# Patient Record
Sex: FEMALE | Race: OTHER | NOT HISPANIC OR LATINO
[De-identification: names, ages, dates, MRNs, and addresses within clinical notes are randomized per-mention and may not be internally consistent; named-entity substitution may affect disease eponyms.]

---

## 2017-01-18 ENCOUNTER — APPOINTMENT (OUTPATIENT)
Dept: DERMATOLOGY | Facility: CLINIC | Age: 67
End: 2017-01-18

## 2017-01-20 ENCOUNTER — APPOINTMENT (OUTPATIENT)
Dept: DERMATOLOGY | Facility: CLINIC | Age: 67
End: 2017-01-20

## 2017-01-23 ENCOUNTER — APPOINTMENT (OUTPATIENT)
Dept: DERMATOLOGY | Facility: CLINIC | Age: 67
End: 2017-01-23

## 2017-02-27 ENCOUNTER — APPOINTMENT (OUTPATIENT)
Dept: DERMATOLOGY | Facility: CLINIC | Age: 67
End: 2017-02-27

## 2017-02-27 VITALS — DIASTOLIC BLOOD PRESSURE: 60 MMHG | SYSTOLIC BLOOD PRESSURE: 92 MMHG

## 2017-02-27 DIAGNOSIS — L81.0 POSTINFLAMMATORY HYPERPIGMENTATION: ICD-10-CM

## 2017-03-01 ENCOUNTER — APPOINTMENT (OUTPATIENT)
Dept: DERMATOLOGY | Facility: CLINIC | Age: 67
End: 2017-03-01

## 2017-03-03 ENCOUNTER — APPOINTMENT (OUTPATIENT)
Dept: DERMATOLOGY | Facility: CLINIC | Age: 67
End: 2017-03-03

## 2017-03-03 VITALS — SYSTOLIC BLOOD PRESSURE: 90 MMHG | DIASTOLIC BLOOD PRESSURE: 60 MMHG

## 2017-03-06 ENCOUNTER — APPOINTMENT (OUTPATIENT)
Dept: DERMATOLOGY | Facility: CLINIC | Age: 67
End: 2017-03-06

## 2017-05-05 ENCOUNTER — APPOINTMENT (OUTPATIENT)
Dept: DERMATOLOGY | Facility: CLINIC | Age: 67
End: 2017-05-05

## 2017-05-05 VITALS — SYSTOLIC BLOOD PRESSURE: 120 MMHG | DIASTOLIC BLOOD PRESSURE: 82 MMHG

## 2017-05-05 RX ORDER — FLUTICASONE PROPIONATE AND SALMETEROL 50; 250 UG/1; UG/1
250-50 POWDER RESPIRATORY (INHALATION)
Qty: 60 | Refills: 0 | Status: ACTIVE | COMMUNITY
Start: 2017-02-07

## 2017-05-05 RX ORDER — RIVAROXABAN 20 MG/1
20 TABLET, FILM COATED ORAL
Qty: 90 | Refills: 0 | Status: ACTIVE | COMMUNITY
Start: 2016-02-29

## 2017-08-31 ENCOUNTER — APPOINTMENT (OUTPATIENT)
Dept: DERMATOLOGY | Facility: CLINIC | Age: 67
End: 2017-08-31
Payer: MEDICARE

## 2017-08-31 VITALS — DIASTOLIC BLOOD PRESSURE: 84 MMHG | SYSTOLIC BLOOD PRESSURE: 110 MMHG

## 2017-08-31 DIAGNOSIS — R23.8 OTHER SKIN CHANGES: ICD-10-CM

## 2017-08-31 PROCEDURE — 99213 OFFICE O/P EST LOW 20 MIN: CPT

## 2017-09-12 ENCOUNTER — APPOINTMENT (OUTPATIENT)
Dept: DERMATOLOGY | Facility: CLINIC | Age: 67
End: 2017-09-12

## 2017-09-14 ENCOUNTER — APPOINTMENT (OUTPATIENT)
Dept: DERMATOLOGY | Facility: CLINIC | Age: 67
End: 2017-09-14
Payer: MEDICARE

## 2017-09-14 VITALS — SYSTOLIC BLOOD PRESSURE: 100 MMHG | DIASTOLIC BLOOD PRESSURE: 65 MMHG

## 2017-09-14 PROCEDURE — 99213 OFFICE O/P EST LOW 20 MIN: CPT | Mod: 25

## 2017-09-14 PROCEDURE — 17110 DESTRUCTION B9 LES UP TO 14: CPT

## 2017-10-20 ENCOUNTER — APPOINTMENT (OUTPATIENT)
Dept: DERMATOLOGY | Facility: CLINIC | Age: 67
End: 2017-10-20
Payer: MEDICARE

## 2017-10-20 VITALS — SYSTOLIC BLOOD PRESSURE: 102 MMHG | DIASTOLIC BLOOD PRESSURE: 60 MMHG

## 2017-10-20 DIAGNOSIS — B07.0 PLANTAR WART: ICD-10-CM

## 2017-10-20 DIAGNOSIS — B07.9 VIRAL WART, UNSPECIFIED: ICD-10-CM

## 2017-10-20 PROCEDURE — 99213 OFFICE O/P EST LOW 20 MIN: CPT | Mod: 25

## 2017-10-20 PROCEDURE — 17110 DESTRUCTION B9 LES UP TO 14: CPT

## 2017-10-22 PROBLEM — B07.0 PLANTAR WART: Status: ACTIVE | Noted: 2017-10-20

## 2017-10-22 PROBLEM — B07.9 VERRUCA VULGARIS: Status: ACTIVE | Noted: 2017-09-14

## 2018-01-23 ENCOUNTER — APPOINTMENT (OUTPATIENT)
Dept: ORTHOPEDIC SURGERY | Facility: CLINIC | Age: 68
End: 2018-01-23
Payer: MEDICARE

## 2018-01-23 VITALS
SYSTOLIC BLOOD PRESSURE: 117 MMHG | WEIGHT: 118 LBS | BODY MASS INDEX: 20.91 KG/M2 | HEIGHT: 63 IN | DIASTOLIC BLOOD PRESSURE: 73 MMHG | HEART RATE: 60 BPM

## 2018-01-23 DIAGNOSIS — M77.11 LATERAL EPICONDYLITIS, RIGHT ELBOW: ICD-10-CM

## 2018-01-23 PROCEDURE — 99203 OFFICE O/P NEW LOW 30 MIN: CPT

## 2018-01-23 PROCEDURE — 73080 X-RAY EXAM OF ELBOW: CPT | Mod: RT

## 2018-07-06 ENCOUNTER — APPOINTMENT (OUTPATIENT)
Dept: OTOLARYNGOLOGY | Facility: CLINIC | Age: 68
End: 2018-07-06
Payer: MEDICARE

## 2018-07-06 VITALS
SYSTOLIC BLOOD PRESSURE: 130 MMHG | HEIGHT: 64 IN | WEIGHT: 119 LBS | BODY MASS INDEX: 20.32 KG/M2 | DIASTOLIC BLOOD PRESSURE: 78 MMHG | RESPIRATION RATE: 18 BRPM | HEART RATE: 60 BPM

## 2018-07-06 DIAGNOSIS — Z86.79 PERSONAL HISTORY OF OTHER DISEASES OF THE CIRCULATORY SYSTEM: ICD-10-CM

## 2018-07-06 DIAGNOSIS — L30.9 DERMATITIS, UNSPECIFIED: ICD-10-CM

## 2018-07-06 DIAGNOSIS — I10 ESSENTIAL (PRIMARY) HYPERTENSION: ICD-10-CM

## 2018-07-06 DIAGNOSIS — D18.00 HEMANGIOMA UNSPECIFIED SITE: ICD-10-CM

## 2018-07-06 DIAGNOSIS — M19.90 UNSPECIFIED OSTEOARTHRITIS, UNSPECIFIED SITE: ICD-10-CM

## 2018-07-06 DIAGNOSIS — D23.9 OTHER BENIGN NEOPLASM OF SKIN, UNSPECIFIED: ICD-10-CM

## 2018-07-06 DIAGNOSIS — H91.90 UNSPECIFIED HEARING LOSS, UNSPECIFIED EAR: ICD-10-CM

## 2018-07-06 PROCEDURE — 31231 NASAL ENDOSCOPY DX: CPT

## 2018-07-06 PROCEDURE — 99204 OFFICE O/P NEW MOD 45 MIN: CPT | Mod: 25

## 2018-07-15 PROBLEM — I10 HYPERTENSION: Status: ACTIVE | Noted: 2018-07-06

## 2018-10-09 ENCOUNTER — OUTPATIENT (OUTPATIENT)
Dept: OUTPATIENT SERVICES | Facility: HOSPITAL | Age: 68
LOS: 1 days | End: 2018-10-09

## 2018-10-09 VITALS
TEMPERATURE: 97 F | WEIGHT: 126.1 LBS | OXYGEN SATURATION: 98 % | HEART RATE: 61 BPM | SYSTOLIC BLOOD PRESSURE: 128 MMHG | HEIGHT: 63.75 IN | RESPIRATION RATE: 14 BRPM | DIASTOLIC BLOOD PRESSURE: 78 MMHG

## 2018-10-09 DIAGNOSIS — R04.0 EPISTAXIS: ICD-10-CM

## 2018-10-09 DIAGNOSIS — Z98.890 OTHER SPECIFIED POSTPROCEDURAL STATES: Chronic | ICD-10-CM

## 2018-10-09 DIAGNOSIS — I48.91 UNSPECIFIED ATRIAL FIBRILLATION: ICD-10-CM

## 2018-10-09 DIAGNOSIS — I10 ESSENTIAL (PRIMARY) HYPERTENSION: ICD-10-CM

## 2018-10-09 DIAGNOSIS — Z91.040 LATEX ALLERGY STATUS: ICD-10-CM

## 2018-10-09 DIAGNOSIS — Z98.891 HISTORY OF UTERINE SCAR FROM PREVIOUS SURGERY: Chronic | ICD-10-CM

## 2018-10-09 LAB
BUN SERPL-MCNC: 22 MG/DL — SIGNIFICANT CHANGE UP (ref 7–23)
CALCIUM SERPL-MCNC: 9.1 MG/DL — SIGNIFICANT CHANGE UP (ref 8.4–10.5)
CHLORIDE SERPL-SCNC: 99 MMOL/L — SIGNIFICANT CHANGE UP (ref 98–107)
CO2 SERPL-SCNC: 25 MMOL/L — SIGNIFICANT CHANGE UP (ref 22–31)
CREAT SERPL-MCNC: 0.9 MG/DL — SIGNIFICANT CHANGE UP (ref 0.5–1.3)
GLUCOSE SERPL-MCNC: 85 MG/DL — SIGNIFICANT CHANGE UP (ref 70–99)
HCT VFR BLD CALC: 42 % — SIGNIFICANT CHANGE UP (ref 34.5–45)
HGB BLD-MCNC: 13.9 G/DL — SIGNIFICANT CHANGE UP (ref 11.5–15.5)
MCHC RBC-ENTMCNC: 29 PG — SIGNIFICANT CHANGE UP (ref 27–34)
MCHC RBC-ENTMCNC: 33.1 % — SIGNIFICANT CHANGE UP (ref 32–36)
MCV RBC AUTO: 87.5 FL — SIGNIFICANT CHANGE UP (ref 80–100)
NRBC # FLD: 0 — SIGNIFICANT CHANGE UP
PLATELET # BLD AUTO: 253 K/UL — SIGNIFICANT CHANGE UP (ref 150–400)
PMV BLD: 12.8 FL — SIGNIFICANT CHANGE UP (ref 7–13)
POTASSIUM SERPL-MCNC: 3.4 MMOL/L — LOW (ref 3.5–5.3)
POTASSIUM SERPL-SCNC: 3.4 MMOL/L — LOW (ref 3.5–5.3)
RBC # BLD: 4.8 M/UL — SIGNIFICANT CHANGE UP (ref 3.8–5.2)
RBC # FLD: 13.1 % — SIGNIFICANT CHANGE UP (ref 10.3–14.5)
SODIUM SERPL-SCNC: 138 MMOL/L — SIGNIFICANT CHANGE UP (ref 135–145)
WBC # BLD: 7.12 K/UL — SIGNIFICANT CHANGE UP (ref 3.8–10.5)
WBC # FLD AUTO: 7.12 K/UL — SIGNIFICANT CHANGE UP (ref 3.8–10.5)

## 2018-10-09 RX ORDER — SODIUM CHLORIDE 9 MG/ML
3 INJECTION INTRAMUSCULAR; INTRAVENOUS; SUBCUTANEOUS EVERY 8 HOURS
Qty: 0 | Refills: 0 | Status: DISCONTINUED | OUTPATIENT
Start: 2018-10-23 | End: 2018-11-07

## 2018-10-09 RX ORDER — SODIUM CHLORIDE 9 MG/ML
1000 INJECTION, SOLUTION INTRAVENOUS
Qty: 0 | Refills: 0 | Status: DISCONTINUED | OUTPATIENT
Start: 2018-10-23 | End: 2018-11-07

## 2018-10-09 NOTE — H&P PST ADULT - PROBLEM SELECTOR PLAN 2
Pt on Xarelto. Was instructed by cardiologist to hold for 3 days before and day of surgery (see cardiac clearance in chart) however surgeon's office prefers pt to be off for one week preop. Called Dr. Fernandez's office and spoke with ALYSSA Martini who states surgeon will speak with cardiologist to determine plan. Will f/u.

## 2018-10-09 NOTE — H&P PST ADULT - MUSCULOSKELETAL
details… detailed exam no joint swelling/no calf tenderness/normal strength/no joint warmth/ROM intact/no joint erythema

## 2018-10-09 NOTE — H&P PST ADULT - FAMILY HISTORY
Father  Still living? Unknown  Family history of atrial fibrillation, Age at diagnosis: Age Unknown     Mother  Still living? Unknown  Family history of atrial fibrillation, Age at diagnosis: Age Unknown  Family history of stroke, Age at diagnosis: Age Unknown     Sibling  Still living? Unknown  Family history of breast cancer, Age at diagnosis: Age Unknown  Hypertension, Age at diagnosis: Age Unknown

## 2018-10-09 NOTE — H&P PST ADULT - PROBLEM SELECTOR PLAN 1
Endoscopic Resection of Anterior Right Inferior Turbinate Nasal Mass with the Guide CO2 Laser Possible Submucous Resection of Turbinates scheduled on 10/23/18.  Pre-op instructions provided. Pt verbalized understanding.   Pepcid provided for GI prophylaxis.

## 2018-10-09 NOTE — H&P PST ADULT - PMH
Atrial fibrillation  on xaleto  Eczema    Hypertension    Osteopenia Atrial fibrillation  on xarelto  Eczema    Hypertension    Osteopenia

## 2018-10-09 NOTE — H&P PST ADULT - HISTORY OF PRESENT ILLNESS
68 year old female with c/o frequent and prolonged nose bleeds worsening over the past few months. Pt has nasopharyngoscopy which found nasal mass. Pt presents today for presurgical evaluation for ... 68 year old female with c/o frequent and prolonged nose bleeds worsening over the past few months. Pt has nasopharyngoscopy which found nasal mass. Pt presents today for presurgical evaluation for Endoscopic Resection of Anterior Right Inferior Turbinate Nasal Mass with the Guide CO2 Laser Possible Submucous Resection of Turbinates scheduled on 10/23/18.

## 2018-10-09 NOTE — H&P PST ADULT - PSH
H/O umbilical hernia repair    History of  section  x2  S/P left knee arthroscopy    S/P right knee arthroscopy

## 2018-10-09 NOTE — H&P PST ADULT - NSANTHOSAYNRD_GEN_A_CORE
No. GERALD screening performed.  STOP BANG Legend: 0-2 = LOW Risk; 3-4 = INTERMEDIATE Risk; 5-8 = HIGH Risk

## 2018-10-10 PROBLEM — I48.91 UNSPECIFIED ATRIAL FIBRILLATION: Chronic | Status: ACTIVE | Noted: 2018-10-09

## 2018-10-22 ENCOUNTER — RX CHANGE (OUTPATIENT)
Age: 68
End: 2018-10-22

## 2018-10-22 ENCOUNTER — TRANSCRIPTION ENCOUNTER (OUTPATIENT)
Age: 68
End: 2018-10-22

## 2018-10-22 DIAGNOSIS — T78.40XA ALLERGY, UNSPECIFIED, INITIAL ENCOUNTER: ICD-10-CM

## 2018-10-23 ENCOUNTER — OUTPATIENT (OUTPATIENT)
Dept: OUTPATIENT SERVICES | Facility: HOSPITAL | Age: 68
LOS: 1 days | Discharge: HOME | End: 2018-10-23
Payer: COMMERCIAL

## 2018-10-23 ENCOUNTER — APPOINTMENT (OUTPATIENT)
Dept: OTOLARYNGOLOGY | Facility: HOSPITAL | Age: 68
End: 2018-10-23

## 2018-10-23 VITALS
HEIGHT: 63.75 IN | WEIGHT: 126.1 LBS | HEART RATE: 55 BPM | RESPIRATION RATE: 15 BRPM | OXYGEN SATURATION: 99 % | SYSTOLIC BLOOD PRESSURE: 127 MMHG | DIASTOLIC BLOOD PRESSURE: 58 MMHG | TEMPERATURE: 98 F

## 2018-10-23 DIAGNOSIS — Z98.890 OTHER SPECIFIED POSTPROCEDURAL STATES: Chronic | ICD-10-CM

## 2018-10-23 DIAGNOSIS — Z98.891 HISTORY OF UTERINE SCAR FROM PREVIOUS SURGERY: Chronic | ICD-10-CM

## 2018-10-23 DIAGNOSIS — R04.0 EPISTAXIS: ICD-10-CM

## 2018-10-23 LAB
BUN SERPL-MCNC: 23 MG/DL — SIGNIFICANT CHANGE UP (ref 7–23)
CALCIUM SERPL-MCNC: 8.8 MG/DL — SIGNIFICANT CHANGE UP (ref 8.4–10.5)
CHLORIDE SERPL-SCNC: 101 MMOL/L — SIGNIFICANT CHANGE UP (ref 98–107)
CO2 SERPL-SCNC: 23 MMOL/L — SIGNIFICANT CHANGE UP (ref 22–31)
CREAT SERPL-MCNC: 0.94 MG/DL — SIGNIFICANT CHANGE UP (ref 0.5–1.3)
GLUCOSE SERPL-MCNC: 126 MG/DL — HIGH (ref 70–99)
POTASSIUM SERPL-MCNC: 3.9 MMOL/L — SIGNIFICANT CHANGE UP (ref 3.5–5.3)
POTASSIUM SERPL-SCNC: 3.9 MMOL/L — SIGNIFICANT CHANGE UP (ref 3.5–5.3)
SODIUM SERPL-SCNC: 139 MMOL/L — SIGNIFICANT CHANGE UP (ref 135–145)

## 2018-10-23 PROCEDURE — 30118 REMOVAL OF INTRANASAL LESION: CPT | Mod: GC,RT

## 2018-10-23 PROCEDURE — 30130 EXCISE INFERIOR TURBINATE: CPT | Mod: GC,RT

## 2018-10-23 RX ORDER — FENTANYL CITRATE 50 UG/ML
25 INJECTION INTRAVENOUS
Qty: 0 | Refills: 0 | Status: DISCONTINUED | OUTPATIENT
Start: 2018-10-23 | End: 2018-10-24

## 2018-10-23 RX ORDER — METOCLOPRAMIDE HCL 10 MG
10 TABLET ORAL ONCE
Qty: 0 | Refills: 0 | Status: DISCONTINUED | OUTPATIENT
Start: 2018-10-23 | End: 2018-10-24

## 2018-10-23 RX ORDER — ACETAMINOPHEN 500 MG
1000 TABLET ORAL ONCE
Qty: 0 | Refills: 0 | Status: DISCONTINUED | OUTPATIENT
Start: 2018-10-23 | End: 2018-10-24

## 2018-10-23 RX ORDER — CEPHALEXIN 500 MG
1 CAPSULE ORAL
Qty: 20 | Refills: 0
Start: 2018-10-23 | End: 2018-11-01

## 2018-10-23 RX ORDER — ONDANSETRON 8 MG/1
4 TABLET, FILM COATED ORAL ONCE
Qty: 0 | Refills: 0 | Status: DISCONTINUED | OUTPATIENT
Start: 2018-10-23 | End: 2018-10-24

## 2018-10-23 RX ORDER — MAGNESIUM SULFATE 500 MG/ML
2 VIAL (ML) INJECTION ONCE
Qty: 0 | Refills: 0 | Status: COMPLETED | OUTPATIENT
Start: 2018-10-23 | End: 2018-10-23

## 2018-10-23 RX ORDER — OXYCODONE AND ACETAMINOPHEN 5; 325 MG/1; MG/1
1 TABLET ORAL
Qty: 10 | Refills: 0
Start: 2018-10-23

## 2018-10-23 RX ORDER — FENTANYL CITRATE 50 UG/ML
50 INJECTION INTRAVENOUS
Qty: 0 | Refills: 0 | Status: DISCONTINUED | OUTPATIENT
Start: 2018-10-23 | End: 2018-10-24

## 2018-10-23 RX ADMIN — Medication 50 GRAM(S): at 23:30

## 2018-10-23 RX ADMIN — SODIUM CHLORIDE 30 MILLILITER(S): 9 INJECTION, SOLUTION INTRAVENOUS at 18:49

## 2018-10-23 NOTE — ASU DISCHARGE PLAN (ADULT/PEDIATRIC). - MEDICATION SUMMARY - MEDICATIONS TO TAKE
I will START or STAY ON the medications listed below when I get home from the hospital:    AlgaeCal Plus  2  orally daily  -- Indication: For Epistaxis    Triple Action Joint Health 1 tab orally daily  -- last dose 10/15/18  -- Indication: For Epistaxis    Turmeric 1000 mg 2 caps orally daily  -- last dose 10/15/18  -- Indication: For Epistaxis    Omega Q Plus 100  2 caps orally daily last dose 10/15  -- Indication: For Epistaxis    Hyaluronic Acid 1 cap orally daily  -- last dose 10/15/18  -- Indication: For Epistaxis    Hydrolyzed Collagen 4 caplets orally daily  -- last dose 10/15/18  -- Indication: For Epistaxis    Percocet 5/325 oral tablet  -- 1 tab(s) by mouth every 4 hours MDD:6  -- Caution federal law prohibits the transfer of this drug to any person other  than the person for whom it was prescribed.  May cause drowsiness.  Alcohol may intensify this effect.  Use care when operating dangerous machinery.  This prescription cannot be refilled.  This product contains acetaminophen.  Do not use  with any other product containing acetaminophen to prevent possible liver damage.  Using more of this medication than prescribed may cause serious breathing problems.    -- Indication: For Epistaxis    rivaroxaban 20 mg oral tablet  -- 1 tab(s) by mouth once a day (in the evening)  -- Indication: For Epistaxis    amlodipine-olmesartan 10 mg-40 mg oral tablet  -- 1 tab(s) by mouth once a day  -- Indication: For Epistaxis    atenolol 25 mg oral tablet  -- 1 tab(s) by mouth once a day  -- Indication: For Epistaxis    Keflex 500 mg oral capsule  -- 1 cap(s) by mouth 2 times a day   -- Finish all this medication unless otherwise directed by prescriber.    -- Indication: For Epistaxis

## 2018-10-24 ENCOUNTER — RESULT REVIEW (OUTPATIENT)
Age: 68
End: 2018-10-24

## 2018-10-24 ENCOUNTER — APPOINTMENT (OUTPATIENT)
Dept: OTOLARYNGOLOGY | Facility: CLINIC | Age: 68
End: 2018-10-24
Payer: MEDICARE

## 2018-10-24 VITALS — HEART RATE: 72 BPM | OXYGEN SATURATION: 98 % | RESPIRATION RATE: 10 BRPM

## 2018-10-24 PROBLEM — L30.9 DERMATITIS, UNSPECIFIED: Chronic | Status: ACTIVE | Noted: 2018-10-09

## 2018-10-24 PROBLEM — M85.80 OTHER SPECIFIED DISORDERS OF BONE DENSITY AND STRUCTURE, UNSPECIFIED SITE: Chronic | Status: ACTIVE | Noted: 2018-10-09

## 2018-10-24 PROBLEM — I10 ESSENTIAL (PRIMARY) HYPERTENSION: Chronic | Status: ACTIVE | Noted: 2018-10-09

## 2018-10-24 PROCEDURE — 88311 DECALCIFY TISSUE: CPT | Mod: 26

## 2018-10-24 PROCEDURE — 88305 TISSUE EXAM BY PATHOLOGIST: CPT | Mod: 26

## 2018-10-24 PROCEDURE — 99024 POSTOP FOLLOW-UP VISIT: CPT

## 2018-10-26 LAB — SURGICAL PATHOLOGY STUDY: SIGNIFICANT CHANGE UP

## 2018-11-07 ENCOUNTER — APPOINTMENT (OUTPATIENT)
Dept: OTOLARYNGOLOGY | Facility: CLINIC | Age: 68
End: 2018-11-07
Payer: COMMERCIAL

## 2018-11-07 PROCEDURE — 99024 POSTOP FOLLOW-UP VISIT: CPT

## 2018-11-12 ENCOUNTER — APPOINTMENT (OUTPATIENT)
Dept: DERMATOLOGY | Facility: CLINIC | Age: 68
End: 2018-11-12
Payer: COMMERCIAL

## 2018-11-12 VITALS — DIASTOLIC BLOOD PRESSURE: 70 MMHG | SYSTOLIC BLOOD PRESSURE: 110 MMHG

## 2018-11-12 PROCEDURE — 99213 OFFICE O/P EST LOW 20 MIN: CPT

## 2018-11-14 ENCOUNTER — EMERGENCY (EMERGENCY)
Facility: HOSPITAL | Age: 68
LOS: 1 days | Discharge: HOME | End: 2018-11-14
Attending: EMERGENCY MEDICINE | Admitting: EMERGENCY MEDICINE
Payer: COMMERCIAL

## 2018-11-14 VITALS
HEART RATE: 64 BPM | RESPIRATION RATE: 15 BRPM | SYSTOLIC BLOOD PRESSURE: 125 MMHG | TEMPERATURE: 98 F | DIASTOLIC BLOOD PRESSURE: 81 MMHG | OXYGEN SATURATION: 97 %

## 2018-11-14 DIAGNOSIS — Z98.890 OTHER SPECIFIED POSTPROCEDURAL STATES: Chronic | ICD-10-CM

## 2018-11-14 DIAGNOSIS — Z98.891 HISTORY OF UTERINE SCAR FROM PREVIOUS SURGERY: Chronic | ICD-10-CM

## 2018-11-14 PROCEDURE — 99283 EMERGENCY DEPT VISIT LOW MDM: CPT | Mod: 25

## 2018-11-14 PROCEDURE — 30903 CONTROL OF NOSEBLEED: CPT | Mod: RT

## 2018-11-14 RX ADMIN — Medication 1 TABLET(S): at 22:30

## 2018-11-14 NOTE — ED ADULT TRIAGE NOTE - CHIEF COMPLAINT QUOTE
Pt c/o nosebleed on xarelto.  No active bleeding noted but states had polyp removal from nose approx 3 weeks ago, 10/23/18.  PMHx: VFIB, HTN

## 2018-11-14 NOTE — ED PROVIDER NOTE - MEDICAL DECISION MAKING DETAILS
EM PGY1 Candelaria Lynn MD: 67yo female with PMH of Atrial fibrillation on xarelto, Eczema, Hypertension, Osteopenia who presents with nose bleed at 4:30PM today. Pt hemodynamically stable, afebrile. No sign or symptoms of active bleeding or anemia. Pt is well appearing. Plan: d/c with f/u.

## 2018-11-14 NOTE — ED PROVIDER NOTE - OBJECTIVE STATEMENT
EM PGY1 Candelaria Lynn MD: 67yo female with PMH of Atrial fibrillation on xarelto, Eczema, Hypertension, Osteopenia who presents with nose bleed at 4:30PM today. Pt states that she was bleeding for approximately 1 hour before it stopped. No known recent trauma to the area. Pt had polyp removal from right nostril 3 weeks ago, with recent follow up 1 week ago with ENT. Congestion, dry cough since last night. No fever, chills, melena, hematochezia, lightheadedness, syncope.

## 2018-11-14 NOTE — ED PROVIDER NOTE - ATTENDING CONTRIBUTION TO CARE
69 yo M presents with resolved R nasal epistaxis. patient states that it started bleeding around 4:30 for about 1 hour. she intermittently applied pressure to nasal bridge and the bleeding resolved eventually. she has NO complaints at this time.   had a nasal polyp removed from R nare 3 weeks ago.   on xarelto.   no recent uri symptoms.   PE no facial TTP. no active epistaxis. lungs CTA. abd soft and NT 69 yo M presents with resolved R nasal epistaxis. patient states that it started bleeding around 4:30 for about 1 hour. she intermittently applied pressure to nasal bridge and the bleeding resolved eventually. she has NO complaints at this time.   had a nasal polyp removed from R nare 3 weeks ago.   on xarelto.   no recent uri symptoms.   PE no facial TTP. no active epistaxis. lungs CTA. abd soft and NT  Patient observed in the ER for a short while. epistaxis recurred. We applied  pressure to the nasal bridge for over 15 minutes. Epistaxis continued. No visualized source of bleeding for Cautery.   We packed the R nare with rhinorocket.  Successful [place] night of the bleeding was achieved. No recurrence of bleeding. Patient tolerated the procedure well. Patient was started on prophylactic antibiotics with Augmentin. Patient was discharged with instructions to  Keep nasal packing in place, antibiotics while packing in place , and follow up with ENT in 1-2 days for repeat evaluation and further management.    The patient was discharged from the ED in stable condition. All results of today's workup were discussed with the patient and all questions/concerns were addressed. All discharge instructions were thoroughly discussed with the patient, as well as important warning signs and new/ worsening symptoms which should necessitate patient's immediate return to the ED. The patient is agreeable with discharge and expresses full understanding of all instructions given. 69 yo M presents with resolved R nasal epistaxis. patient states that it started bleeding around 4:30 for about 1 hour. she intermittently applied pressure to nasal bridge and the bleeding resolved eventually. she has NO complaints at this time.   had a nasal polyp removed from R nare 3 weeks ago.   on xarelto.   no recent uri symptoms.   PE no facial TTP. no active epistaxis. lungs CTA. abd soft and NT  Patient observed in the ER for a short while. epistaxis recurred. We applied  pressure to the nasal bridge for over 15 minutes. Epistaxis continued. No visualized source of bleeding for Cautery.   We packed the R nare with rhinorocket.  Successful Tamponade of the bleeding was achieved. No recurrence of bleeding. Patient tolerated the procedure well. Vital signs stable, ambulate around the ER without complaints. Patient was started on prophylactic antibiotics with Augmentin. Patient was discharged with instructions to  Keep nasal packing in place, antibiotics while packing in place , and follow up with ENT in 1-2 days for repeat evaluation and further management.    The patient was discharged from the ED in stable condition. All results of today's workup were discussed with the patient and all questions/concerns were addressed. All discharge instructions were thoroughly discussed with the patient, as well as important warning signs and new/ worsening symptoms which should necessitate patient's immediate return to the ED. The patient is agreeable with discharge and expresses full understanding of all instructions given.

## 2018-11-14 NOTE — ED PROVIDER NOTE - NS ED ROS FT
EM PGY1 Candelaria Lynn MD:   General: denies fever, chills  HENT: +nasal congestion, denies sore throat, rhinorrhea  Eyes: denies vision changes  CV: denies chest pain  Resp: denies difficulty breathing, +cough  Abdominal: denies nausea, vomiting, diarrhea, abdominal pain, blood in stool, dark stool  : denies pain with urination  MSK: denies recent trauma  Neuro: denies headaches, numbness, tingling, dizziness, lightheadedness.  Skin: denies new rashes  Endocrine: denies recent weight loss

## 2018-11-14 NOTE — ED PROVIDER NOTE - PHYSICAL EXAMINATION
EM PGY1 Candelaria Lynn MD:   CONSTITUTIONAL: Nontoxic, well nourished, well developed, elderly female, resting comfortably in no acute distress  HEAD: Normocephalic; atraumatic  EYES: Normal inspection, EOMI  ENMT: External appears normal; normal oropharynx, no active bleeding from nostrils, dried blood in right nostril.   NECK: Supple; non-tender; no cervical lymphadenopathy  CARD: RRR; no audible murmurs, rubs, or gallops  RESP: No respiratory distress, lungs ctab/l  ABD: Soft, non-distended; non-tender; no rebound or guarding  EXT: No LE pitting edema or calf tenderness; distal pulses intact with good capillary refill  SKIN: Warm, dry, intact  NEURO: aaox3, moving all ext spontaneously.

## 2018-11-15 ENCOUNTER — APPOINTMENT (OUTPATIENT)
Dept: OTOLARYNGOLOGY | Facility: CLINIC | Age: 68
End: 2018-11-15
Payer: MEDICARE

## 2018-11-15 DIAGNOSIS — Z82.62 FAMILY HISTORY OF OSTEOPOROSIS: ICD-10-CM

## 2018-11-15 DIAGNOSIS — Z86.79 PERSONAL HISTORY OF OTHER DISEASES OF THE CIRCULATORY SYSTEM: ICD-10-CM

## 2018-11-15 DIAGNOSIS — Z87.39 PERSONAL HISTORY OF OTHER DISEASES OF THE MUSCULOSKELETAL SYSTEM AND CONNECTIVE TISSUE: ICD-10-CM

## 2018-11-15 DIAGNOSIS — Z78.9 OTHER SPECIFIED HEALTH STATUS: ICD-10-CM

## 2018-11-15 PROCEDURE — 99024 POSTOP FOLLOW-UP VISIT: CPT

## 2018-12-10 ENCOUNTER — APPOINTMENT (OUTPATIENT)
Dept: DERMATOLOGY | Facility: CLINIC | Age: 68
End: 2018-12-10
Payer: COMMERCIAL

## 2018-12-10 VITALS — DIASTOLIC BLOOD PRESSURE: 70 MMHG | SYSTOLIC BLOOD PRESSURE: 110 MMHG

## 2018-12-10 DIAGNOSIS — L25.9 UNSPECIFIED CONTACT DERMATITIS, UNSPECIFIED CAUSE: ICD-10-CM

## 2018-12-10 PROCEDURE — 99213 OFFICE O/P EST LOW 20 MIN: CPT

## 2018-12-10 RX ORDER — PREDNISONE 20 MG/1
20 TABLET ORAL TWICE DAILY
Qty: 28 | Refills: 2 | Status: COMPLETED | COMMUNITY
Start: 2018-10-22 | End: 2018-12-10

## 2018-12-10 RX ORDER — HYDROCORTISONE 25 MG/G
2.5 OINTMENT TOPICAL
Qty: 1 | Refills: 1 | Status: COMPLETED | COMMUNITY
Start: 2018-11-12 | End: 2018-12-10

## 2018-12-10 RX ORDER — TACROLIMUS 1 MG/G
0.1 OINTMENT TOPICAL
Qty: 1 | Refills: 1 | Status: COMPLETED | COMMUNITY
Start: 2017-09-14 | End: 2018-12-10

## 2018-12-10 RX ORDER — PERMETHRIN 50 MG/G
5 CREAM TOPICAL
Qty: 120 | Refills: 0 | Status: COMPLETED | COMMUNITY
Start: 2017-01-27 | End: 2018-12-10

## 2018-12-10 RX ORDER — HYDROCORTISONE 25 MG/G
2.5 CREAM TOPICAL TWICE DAILY
Qty: 1 | Refills: 2 | Status: COMPLETED | COMMUNITY
Start: 2017-08-31 | End: 2018-12-10

## 2018-12-11 PROBLEM — L25.9 CONTACT DERMATITIS: Status: ACTIVE | Noted: 2018-11-12

## 2019-01-08 ENCOUNTER — EMERGENCY (EMERGENCY)
Facility: HOSPITAL | Age: 69
LOS: 1 days | Discharge: HOME | End: 2019-01-08
Attending: EMERGENCY MEDICINE
Payer: COMMERCIAL

## 2019-01-08 VITALS
SYSTOLIC BLOOD PRESSURE: 108 MMHG | DIASTOLIC BLOOD PRESSURE: 61 MMHG | TEMPERATURE: 99 F | HEART RATE: 64 BPM | OXYGEN SATURATION: 97 % | RESPIRATION RATE: 16 BRPM

## 2019-01-08 VITALS
HEIGHT: 63 IN | HEART RATE: 97 BPM | OXYGEN SATURATION: 96 % | SYSTOLIC BLOOD PRESSURE: 132 MMHG | RESPIRATION RATE: 16 BRPM | TEMPERATURE: 98 F | DIASTOLIC BLOOD PRESSURE: 87 MMHG | WEIGHT: 123.9 LBS

## 2019-01-08 DIAGNOSIS — Z98.891 HISTORY OF UTERINE SCAR FROM PREVIOUS SURGERY: Chronic | ICD-10-CM

## 2019-01-08 DIAGNOSIS — Z98.890 OTHER SPECIFIED POSTPROCEDURAL STATES: Chronic | ICD-10-CM

## 2019-01-08 LAB
ALBUMIN SERPL ELPH-MCNC: 3.8 G/DL — SIGNIFICANT CHANGE UP (ref 3.3–5)
ALP SERPL-CCNC: 75 U/L — SIGNIFICANT CHANGE UP (ref 40–120)
ALT FLD-CCNC: 119 U/L — HIGH (ref 10–45)
ANION GAP SERPL CALC-SCNC: 13 MMOL/L — SIGNIFICANT CHANGE UP (ref 5–17)
AST SERPL-CCNC: 95 U/L — HIGH (ref 10–40)
BASOPHILS # BLD AUTO: 0.1 K/UL — SIGNIFICANT CHANGE UP (ref 0–0.2)
BASOPHILS NFR BLD AUTO: 0.3 % — SIGNIFICANT CHANGE UP (ref 0–2)
BILIRUB SERPL-MCNC: 0.8 MG/DL — SIGNIFICANT CHANGE UP (ref 0.2–1.2)
BUN SERPL-MCNC: 18 MG/DL — SIGNIFICANT CHANGE UP (ref 7–23)
CALCIUM SERPL-MCNC: 9.3 MG/DL — SIGNIFICANT CHANGE UP (ref 8.4–10.5)
CHLORIDE SERPL-SCNC: 100 MMOL/L — SIGNIFICANT CHANGE UP (ref 96–108)
CO2 SERPL-SCNC: 25 MMOL/L — SIGNIFICANT CHANGE UP (ref 22–31)
CREAT SERPL-MCNC: 0.82 MG/DL — SIGNIFICANT CHANGE UP (ref 0.5–1.3)
EOSINOPHIL # BLD AUTO: 0.1 K/UL — SIGNIFICANT CHANGE UP (ref 0–0.5)
EOSINOPHIL NFR BLD AUTO: 0.3 % — SIGNIFICANT CHANGE UP (ref 0–6)
GLUCOSE SERPL-MCNC: 128 MG/DL — HIGH (ref 70–99)
HCT VFR BLD CALC: 37.7 % — SIGNIFICANT CHANGE UP (ref 34.5–45)
HGB BLD-MCNC: 13.1 G/DL — SIGNIFICANT CHANGE UP (ref 11.5–15.5)
LYMPHOCYTES # BLD AUTO: 1.1 K/UL — SIGNIFICANT CHANGE UP (ref 1–3.3)
LYMPHOCYTES # BLD AUTO: 7.6 % — LOW (ref 13–44)
MCHC RBC-ENTMCNC: 29.9 PG — SIGNIFICANT CHANGE UP (ref 27–34)
MCHC RBC-ENTMCNC: 34.7 GM/DL — SIGNIFICANT CHANGE UP (ref 32–36)
MCV RBC AUTO: 86.1 FL — SIGNIFICANT CHANGE UP (ref 80–100)
MONOCYTES # BLD AUTO: 1.4 K/UL — HIGH (ref 0–0.9)
MONOCYTES NFR BLD AUTO: 9.4 % — SIGNIFICANT CHANGE UP (ref 2–14)
NEUTROPHILS # BLD AUTO: 12.3 K/UL — HIGH (ref 1.8–7.4)
NEUTROPHILS NFR BLD AUTO: 82.3 % — HIGH (ref 43–77)
PLATELET # BLD AUTO: 222 K/UL — SIGNIFICANT CHANGE UP (ref 150–400)
POTASSIUM SERPL-MCNC: 3.6 MMOL/L — SIGNIFICANT CHANGE UP (ref 3.5–5.3)
POTASSIUM SERPL-SCNC: 3.6 MMOL/L — SIGNIFICANT CHANGE UP (ref 3.5–5.3)
PROT SERPL-MCNC: 7.2 G/DL — SIGNIFICANT CHANGE UP (ref 6–8.3)
RBC # BLD: 4.38 M/UL — SIGNIFICANT CHANGE UP (ref 3.8–5.2)
RBC # FLD: 12.4 % — SIGNIFICANT CHANGE UP (ref 10.3–14.5)
SODIUM SERPL-SCNC: 138 MMOL/L — SIGNIFICANT CHANGE UP (ref 135–145)
TROPONIN T, HIGH SENSITIVITY RESULT: 8 NG/L — SIGNIFICANT CHANGE UP (ref 0–51)
WBC # BLD: 14.9 K/UL — HIGH (ref 3.8–10.5)
WBC # FLD AUTO: 14.9 K/UL — HIGH (ref 3.8–10.5)

## 2019-01-08 PROCEDURE — 93005 ELECTROCARDIOGRAM TRACING: CPT

## 2019-01-08 PROCEDURE — 93010 ELECTROCARDIOGRAM REPORT: CPT

## 2019-01-08 PROCEDURE — 84484 ASSAY OF TROPONIN QUANT: CPT

## 2019-01-08 PROCEDURE — 99285 EMERGENCY DEPT VISIT HI MDM: CPT | Mod: 25

## 2019-01-08 PROCEDURE — 71046 X-RAY EXAM CHEST 2 VIEWS: CPT | Mod: 26

## 2019-01-08 PROCEDURE — 99284 EMERGENCY DEPT VISIT MOD MDM: CPT | Mod: 25

## 2019-01-08 PROCEDURE — 85027 COMPLETE CBC AUTOMATED: CPT

## 2019-01-08 PROCEDURE — 71046 X-RAY EXAM CHEST 2 VIEWS: CPT

## 2019-01-08 PROCEDURE — 96374 THER/PROPH/DIAG INJ IV PUSH: CPT

## 2019-01-08 PROCEDURE — 80053 COMPREHEN METABOLIC PANEL: CPT

## 2019-01-08 RX ORDER — KETOROLAC TROMETHAMINE 30 MG/ML
15 SYRINGE (ML) INJECTION ONCE
Qty: 0 | Refills: 0 | Status: DISCONTINUED | OUTPATIENT
Start: 2019-01-08 | End: 2019-01-08

## 2019-01-08 RX ADMIN — Medication 15 MILLIGRAM(S): at 08:42

## 2019-01-08 RX ADMIN — Medication 15 MILLIGRAM(S): at 07:38

## 2019-01-08 NOTE — ED PROVIDER NOTE - MEDICAL DECISION MAKING DETAILS
69yo F with PMH afib on Xarelto, eczema, HTN, osteopenia presenting with chest pain starting at 2200 earlier tonight in the setting of 7-8 days of intermittent non-productive cough. 69yo F with PMH afib on Xarelto, eczema, HTN, osteopenia presenting with chest pain starting at 2200 earlier tonight in the setting of 7-8 days of intermittent non-productive cough. CXR clear lungs. Will recommend continuing outpatient abx as ordered and f/u with PMD. Troponin 12 initially, repeat pending. Chest pain likely costochondritis 2/2 frequent cough.

## 2019-01-08 NOTE — ED ADULT NURSE NOTE - NSIMPLEMENTINTERV_GEN_ALL_ED
Implemented All Universal Safety Interventions:  [place] to call system. Call bell, personal items and telephone within reach. Instruct patient to call for assistance. Room bathroom lighting operational. Non-slip footwear when patient is off stretcher. Physically safe environment: no spills, clutter or unnecessary equipment. Stretcher in lowest position, wheels locked, appropriate side rails in place.

## 2019-01-08 NOTE — ED PROVIDER NOTE - ATTENDING CONTRIBUTION TO CARE
68F, pmh afib on xarelto, htn, presents with cough x 1 week, chest pain beginning ~10 pm yesterday. pt with ~1 week non-productive cough, seen by pmd yesterday and rx'd an antibiotic (but not sure which). also with subjective fever at home x 2-3 days. yesterday evening, developed waxing and waning chest pain, across front of chest, worse with cough or taking a deep breath, soreness, moderate, primary located in L pectoral region. non-exertional. no associated sob, diaphoresis, extremity pain/numbness. pain most sig when coughing. does not radiate to back. denies calf pain/swelling, hx dvt or pe. denies headache dizziness, abd pain, n/v/d, changes in urination or bm's, or any other complaints.    PE: Well appearing female in NAD, NCAT, MMM, Trachea midline, Normal conjunctiva, lungs CTAB, S1/S2 RRR, Normal perfusion, 2+ radial pulses bilat, Abdomen Soft, NTND, No rebound/guarding, No LE edema, No deformity of extremities, No rashes,  No focal motor or sensory deficits.     Pt well appearing. VS without sig abnormality. Chest pain is in setting of cough x ~1 week, worse with cough, most likely 2/2 muscle strain or costochondritis. consider acs, although much lower suspicion, ekg without acute ischemic changes, non-exertional, no associated cardiac sx, obtain troponin. very low suspicion pe, no tachycardia or hypoxia, is compliant with her xarelto, no calf pain/swelling. very low suspicion of aortic pathology, equal pulses, does not radiate to back, without sig hypertension. Check CBC eval for anemia, leukocytosis, cmp eval for metabolic derangement. CXR eval for consolidation. Re-eval. - Javier Kenny MD

## 2019-01-08 NOTE — ED PROVIDER NOTE - OBJECTIVE STATEMENT
Pt is a 67yo F with PMH afib on Xarelto, eczema, HTN, osteopenia presenting with chest pain starting at 2200 earlier tonight in the setting of 7-8 days of intermittent non-productive cough. She reports having had low grade subjective fevers at home for 2-3 days. Earlier today she went to see her PMD Dr. Morse and was ordered for an antibiotic. She took the first dose this evening at 8pm and cannot recall the antibiotic. She also had an outpatient XR, read pending. Regarding her chest pain, she reports that it moves from the left to right side of her chest and is exacerbated by deep breaths and cough, currently located primarily on left side of chest. It does not radiate to back or shoulder and is not associated with shortness of breath. Exertion does not exacerbate her chest pain. ROS otherwise positive for post-nasal drip.

## 2019-01-08 NOTE — ED PROVIDER NOTE - PROGRESS NOTE DETAILS
pt with continued L-sided chest soreness, worse with cough. delta trop 8 from 12. remains very low suspicion of cardiac etiology. pt to be given toradol and re-assessed. will touch base with pt's cardiologist bianka. if sx improve plan for d/c with close cards f/u. pt signed out to dr macias. - Javier Kenny MD [place]: Attempted to call Jono x 3 with no response. Spoke with patient, patient understands to f/u outpatient with cardiologist. Agrees with plan and would like to go home. Patient not having any chest pain at this time and improved with toradol.  will d/c home.

## 2019-01-08 NOTE — ED ADULT NURSE REASSESSMENT NOTE - NS ED NURSE REASSESS COMMENT FT1
Pt is resting comfortably, states pain at rest is a 3/10, states pain is on the left chest. Administered ketoralac,  is at bedside.
Pt is currently resting comfortably, pending pt's cardiologist call back. Will continue to monitor.

## 2019-01-08 NOTE — ED PROVIDER NOTE - NSFOLLOWUPINSTRUCTIONS_ED_ALL_ED_FT
You were seen in the Emergency Department (ED) for chest pain. Your workup in the ED was negative for life-threatening causes of chest pain. Please take over the counter Tylenol or Ibuprofen as directed by the packaging insert for your pain.   Please follow up with your Cardiologist in the next 2 days.   Please immediately return to the ED if you experience any new, concerning, or worsening symptoms, including, but not limited to the following: persistent, or worsening pain, difficulty breathing, fever, chills. Thank you for choosing a [facility] ED.

## 2019-01-08 NOTE — ED PROVIDER NOTE - CONSTITUTIONAL, MLM
normal... Well appearing, well nourished, awake, alert, oriented to person, place, time/situation and in no apparent distress. Hoarse voice

## 2019-01-08 NOTE — ED ADULT NURSE NOTE - OBJECTIVE STATEMENT
Pt presents to the ED with complaint of chest pain, AXOX3, with sig other at the bedside, reports pain beginning at 9pm, to the left side of chest under the breast, radiating "back and forth" to the right side, pt states pain is sharp in quality, no diaphoresis, no nausea vomiting or diarrhea, reports low grade fever in the evening, with cough, breathing unlabored and symmetrical, reports pain with inspiration, no shortness of breath, no dysuria or hematuria. Pt took one baby ASA at home prior to coming to ED.

## 2019-01-23 ENCOUNTER — APPOINTMENT (OUTPATIENT)
Dept: OTOLARYNGOLOGY | Facility: CLINIC | Age: 69
End: 2019-01-23
Payer: MEDICARE

## 2019-01-23 VITALS
HEART RATE: 70 BPM | WEIGHT: 122 LBS | BODY MASS INDEX: 20.83 KG/M2 | HEIGHT: 64 IN | DIASTOLIC BLOOD PRESSURE: 73 MMHG | SYSTOLIC BLOOD PRESSURE: 111 MMHG

## 2019-01-23 DIAGNOSIS — Z78.9 OTHER SPECIFIED HEALTH STATUS: ICD-10-CM

## 2019-01-23 DIAGNOSIS — J34.3 HYPERTROPHY OF NASAL TURBINATES: ICD-10-CM

## 2019-01-23 DIAGNOSIS — R04.0 EPISTAXIS: ICD-10-CM

## 2019-01-23 DIAGNOSIS — I99.9 UNSPECIFIED DISORDER OF CIRCULATORY SYSTEM: ICD-10-CM

## 2019-01-23 DIAGNOSIS — I48.91 UNSPECIFIED ATRIAL FIBRILLATION: ICD-10-CM

## 2019-01-23 DIAGNOSIS — Z80.8 FAMILY HISTORY OF MALIGNANT NEOPLASM OF OTHER ORGANS OR SYSTEMS: ICD-10-CM

## 2019-01-23 PROCEDURE — 31231 NASAL ENDOSCOPY DX: CPT

## 2019-01-23 PROCEDURE — 99214 OFFICE O/P EST MOD 30 MIN: CPT | Mod: 25

## 2019-01-23 RX ORDER — HYDROXYZINE HYDROCHLORIDE 10 MG/1
10 TABLET ORAL
Qty: 30 | Refills: 0 | Status: DISCONTINUED | COMMUNITY
Start: 2017-01-27 | End: 2019-01-23

## 2019-01-23 RX ORDER — AMOXICILLIN AND CLAVULANATE POTASSIUM 875; 125 MG/1; MG/1
875-125 TABLET, COATED ORAL
Qty: 14 | Refills: 0 | Status: DISCONTINUED | COMMUNITY
Start: 2017-02-02 | End: 2019-01-23

## 2019-01-23 RX ORDER — HALOBETASOL PROPIONATE 0.5 MG/G
0.05 OINTMENT TOPICAL
Qty: 50 | Refills: 0 | Status: DISCONTINUED | COMMUNITY
Start: 2017-01-27 | End: 2019-01-23

## 2019-01-23 RX ORDER — MUPIROCIN 20 MG/G
2 OINTMENT TOPICAL
Qty: 22 | Refills: 0 | Status: DISCONTINUED | COMMUNITY
Start: 2017-01-27 | End: 2019-01-23

## 2019-01-23 RX ORDER — DOXYCYCLINE HYCLATE 100 MG/1
100 CAPSULE ORAL
Qty: 14 | Refills: 0 | Status: DISCONTINUED | COMMUNITY
Start: 2017-03-04 | End: 2019-01-23

## 2019-01-23 RX ORDER — PROMETHAZINE HYDROCHLORIDE AND DEXTROMETHORPHAN HYDROBROMIDE ORAL SOLUTION 15; 6.25 MG/5ML; MG/5ML
6.25-15 SOLUTION ORAL
Qty: 118 | Refills: 0 | Status: DISCONTINUED | COMMUNITY
Start: 2017-03-04 | End: 2019-01-23

## 2019-01-23 RX ORDER — CLOBETASOL PROPIONATE 0.5 MG/G
0.05 OINTMENT TOPICAL
Qty: 1 | Refills: 1 | Status: DISCONTINUED | COMMUNITY
Start: 2018-11-12 | End: 2019-01-23

## 2019-01-23 RX ORDER — EUCALYPTUS/PEPPERMINT OIL
SOLUTION, NON-ORAL NASAL
Qty: 1 | Refills: 5 | Status: ACTIVE | COMMUNITY
Start: 2019-01-23 | End: 1900-01-01

## 2019-01-23 RX ORDER — TACROLIMUS 1 MG/G
0.1 OINTMENT TOPICAL
Qty: 1 | Refills: 3 | Status: DISCONTINUED | COMMUNITY
Start: 2018-12-10 | End: 2019-01-23

## 2019-01-23 NOTE — PROCEDURE
[Image(s) Captured] : image(s) captured and filed [Video Captured] : video captured and filed [Topical Lidocaine] : topical lidocaine [Oxymetazoline HCl] : oxymetazoline HCl [Rigid Endoscope] : examined with a rigid endoscope [Serial Number: ___] : Serial Number: [unfilled] [Recalcitrant Symptoms] : recalcitrant symptoms  [Anatomical Abnormality] : anatomical abnormality [Epistaxis] : evaluation of epistaxis [Anterior rhinoscopy insufficient to account for symptoms] : anterior rhinoscopy insufficient to account for symptoms [FreeTextEntry6] : Pre-op indication(s): right vascular lesion\par Post-op indication(s): turbinate healing\par Verbal consent obtained from patient.\par “Anterior rhinoscopy insufficient to account for symptoms” \par Details for procedure: \par Scope #: [id number]\par Type of scope:    flexible fiber optic telescope  X   Rigid glass telescope \par Anesthesia and/or vasoconstriction was achieved topically by using: \par 4% Lidocaine spray   0.05% Oxymetazoline     Other ______ \par The following anatomic sites were directly examined in a sequential fashion: \par The scope was introduced in the nasal passage between the middle and inferior turbinates to exam the inferior portion of the middle meatus and the fontanelle, as well as the maxillary ostia. Next, the scope was passed medially and posteriorly to the middle turbinates to examine the sphenoethmoid recess and the superior turbinate region. \par Upon visualization the finders are as follows: \par Nasal Septum:   Normal  \par Bleeding site cauterized:    Anterior   left   right   Posterior   left   right \par Method:   Silver Nitrate   YAG Laser    Electrocautery ______ \par Right Side: \par * Mucosa: Normal\par * Mucous: Normal\par * Polyp: Normal\par * Inferior Turbinate: Crusted region the tumor was removed\par * Middle Turbinate: Normal\par * Superior Turbinate: Normal\par * Inferior Meatus: Normal\par * Middle Meatus: Normal\par * Super Meatus: Normal\par * Sphenoethmoidal Recess: Normal\par Left Side: \par * Mucosa: Normal\par * Mucous: Normal\par * Polyp: Normal\par * Inferior Turbinate: Normal\par * Middle Turbinate: Normal\par * Superior Turbinate: Normal\par * Inferior Meatus: Normal\par * Middle Meatus: Normal\par * Super Meatus: Normal\par * Sphenoethmoidal Recess: Normal\par The patient tolerated the procedure well without any complications.\par \par \par

## 2019-01-23 NOTE — PHYSICAL EXAM
[Nasal Endoscopy Performed] : nasal endoscopy was performed, see procedure section for findings [Midline] : trachea located in midline position [Normal] : no rashes [de-identified] : anterior right tip healing

## 2019-01-23 NOTE — REASON FOR VISIT
[Subsequent Evaluation] : a subsequent evaluation for [Other: _____] : [unfilled] [Nasal Obstruction] : nasal obstruction [Epistaxis] : epistaxis [FreeTextEntry2] : follow up s/p Endoscopic CO2 laser resection of right nasal mass 10/23/18

## 2019-01-23 NOTE — HISTORY OF PRESENT ILLNESS
[de-identified] : 68 year old female follow up s/p Endoscopic CO2 laser resection of right nasal mass 10/23/18.  States no further bleeding from right nostril since November 2018.  States breathing well, denies pain.

## 2019-12-29 ENCOUNTER — TRANSCRIPTION ENCOUNTER (OUTPATIENT)
Age: 69
End: 2019-12-29

## 2020-01-12 ENCOUNTER — TRANSCRIPTION ENCOUNTER (OUTPATIENT)
Age: 70
End: 2020-01-12

## 2020-09-30 NOTE — ED PROVIDER NOTE - NS_HEARTSCORE_ED_A_ED_CALC
Otezla Counseling: The side effects of Otezla were discussed with the patient, including but not limited to worsening or new depression, weight loss, diarrhea, nausea, upper respiratory tract infection, and headache. Patient instructed to call the office should any adverse effect occur.  The patient verbalized understanding of the proper use and possible adverse effects of Otezla.  All the patient's questions and concerns were addressed. Methotrexate Counseling:  Patient counseled regarding adverse effects of methotrexate including but not limited to nausea, vomiting, abnormalities in liver function tests. Patients may develop mouth sores, rash, diarrhea, and abnormalities in blood counts. The patient understands that monitoring is required including LFT's and blood counts.  There is a rare possibility of scarring of the liver and lung problems that can occur when taking methotrexate. Persistent nausea, loss of appetite, pale stools, dark urine, cough, and shortness of breath should be reported immediately. Patient advised to discontinue methotrexate treatment at least three months before attempting to become pregnant.  I discussed the need for folate supplements while taking methotrexate.  These supplements can decrease side effects during methotrexate treatment. The patient verbalized understanding of the proper use and possible adverse effects of methotrexate.  All of the patient's questions and concerns were addressed. Enbrel Pregnancy And Lactation Text: This medication is Pregnancy Category B and is considered safe during pregnancy. It is unknown if this medication is excreted in breast milk. Bactrim Counseling:  I discussed with the patient the risks of sulfa antibiotics including but not limited to GI upset, allergic reaction, drug rash, diarrhea, dizziness, photosensitivity, and yeast infections.  Rarely, more serious reactions can occur including but not limited to aplastic anemia, agranulocytosis, methemoglobinemia, blood dyscrasias, liver or kidney failure, lung infiltrates or desquamative/blistering drug rashes. Colchicine Counseling:  Patient counseled regarding adverse effects including but not limited to stomach upset (nausea, vomiting, stomach pain, or diarrhea).  Patient instructed to limit alcohol consumption while taking this medication.  Colchicine may reduce blood counts especially with prolonged use.  The patient understands that monitoring of kidney function and blood counts may be required, especially at baseline. The patient verbalized understanding of the proper use and possible adverse effects of colchicine.  All of the patient's questions and concerns were addressed. Taltz Counseling: I discussed with the patient the risks of ixekizumab including but not limited to immunosuppression, serious infections, worsening of inflammatory bowel disease and drug reactions.  The patient understands that monitoring is required including a PPD at baseline and must alert us or the primary physician if symptoms of infection or other concerning signs are noted. Benzoyl Peroxide Counseling: Patient counseled that medicine may cause skin irritation and bleach clothing.  In the event of skin irritation, the patient was advised to reduce the amount of the drug applied or use it less frequently.   The patient verbalized understanding of the proper use and possible adverse effects of benzoyl peroxide.  All of the patient's questions and concerns were addressed. Imiquimod Counseling:  I discussed with the patient the risks of imiquimod including but not limited to erythema, scaling, itching, weeping, crusting, and pain.  Patient understands that the inflammatory response to imiquimod is variable from person to person and was educated regarded proper titration schedule.  If flu-like symptoms develop, patient knows to discontinue the medication and contact us. Tetracycline Pregnancy And Lactation Text: This medication is Pregnancy Category D and not consider safe during pregnancy. It is also excreted in breast milk. Topical Clindamycin Pregnancy And Lactation Text: This medication is Pregnancy Category B and is considered safe during pregnancy. It is unknown if it is excreted in breast milk. Otezla Pregnancy And Lactation Text: This medication is Pregnancy Category C and it isn't known if it is safe during pregnancy. It is unknown if it is excreted in breast milk. Methotrexate Pregnancy And Lactation Text: This medication is Pregnancy Category X and is known to cause fetal harm. This medication is excreted in breast milk. Hydroxyzine Counseling: Patient advised that the medication is sedating and not to drive a car after taking this medication.  Patient informed of potential adverse effects including but not limited to dry mouth, urinary retention, and blurry vision.  The patient verbalized understanding of the proper use and possible adverse effects of hydroxyzine.  All of the patient's questions and concerns were addressed. Benzoyl Peroxide Pregnancy And Lactation Text: This medication is Pregnancy Category C. It is unknown if benzoyl peroxide is excreted in breast milk. Humira Counseling:  I discussed with the patient the risks of adalimumab including but not limited to myelosuppression, immunosuppression, autoimmune hepatitis, demyelinating diseases, lymphoma, and serious infections.  The patient understands that monitoring is required including a PPD at baseline and must alert us or the primary physician if symptoms of infection or other concerning signs are noted. Topical Sulfur Applications Counseling: Topical Sulfur Counseling: Patient counseled that this medication may cause skin irritation or allergic reactions.  In the event of skin irritation, the patient was advised to reduce the amount of the drug applied or use it less frequently.   The patient verbalized understanding of the proper use and possible adverse effects of topical sulfur application.  All of the patient's questions and concerns were addressed. Taltz Pregnancy And Lactation Text: The risk during pregnancy and breastfeeding is uncertain with this medication. Prednisone Counseling:  I discussed with the patient the risks of prolonged use of prednisone including but not limited to weight gain, insomnia, osteoporosis, mood changes, diabetes, susceptibility to infection, glaucoma and high blood pressure.  In cases where prednisone use is prolonged, patients should be monitored with blood pressure checks, serum glucose levels and an eye exam.  Additionally, the patient may need to be placed on GI prophylaxis, PCP prophylaxis, and calcium and vitamin D supplementation and/or a bisphosphonate.  The patient verbalized understanding of the proper use and the possible adverse effects of prednisone.  All of the patient's questions and concerns were addressed. Bactrim Pregnancy And Lactation Text: This medication is Pregnancy Category D and is known to cause fetal risk.  It is also excreted in breast milk. Hydroxyzine Pregnancy And Lactation Text: This medication is not safe during pregnancy and should not be taken. It is also excreted in breast milk and breast feeding isn't recommended. Imiquimod Pregnancy And Lactation Text: This medication is Pregnancy Category C. It is unknown if this medication is excreted in breast milk. Colchicine Pregnancy And Lactation Text: This medication is Pregnancy Category C and isn't considered safe during pregnancy. It is excreted in breast milk. Oxybutynin Counseling:  I discussed with the patient the risks of oxybutynin including but not limited to skin rash, drowsiness, dry mouth, difficulty urinating, and blurred vision. Tremfya Counseling: I discussed with the patient the risks of guselkumab including but not limited to immunosuppression, serious infections, worsening of inflammatory bowel disease and drug reactions.  The patient understands that monitoring is required including a PPD at baseline and must alert us or the primary physician if symptoms of infection or other concerning signs are noted. Carac Counseling:  I discussed with the patient the risks of Carac including but not limited to erythema, scaling, itching, weeping, crusting, and pain. Fluconazole Counseling:  Patient counseled regarding adverse effects of fluconazole including but not limited to headache, diarrhea, nausea, upset stomach, liver function test abnormalities, taste disturbance, and stomach pain.  There is a rare possibility of liver failure that can occur when taking fluconazole.  The patient understands that monitoring of LFTs and kidney function test may be required, especially at baseline. The patient verbalized understanding of the proper use and possible adverse effects of fluconazole.  All of the patient's questions and concerns were addressed. Dapsone Counseling: I discussed with the patient the risks of dapsone including but not limited to hemolytic anemia, agranulocytosis, rashes, methemoglobinemia, kidney failure, peripheral neuropathy, headaches, GI upset, and liver toxicity.  Patients who start dapsone require monitoring including baseline LFTs and weekly CBCs for the first month, then every month thereafter.  The patient verbalized understanding of the proper use and possible adverse effects of dapsone.  All of the patient's questions and concerns were addressed. Minoxidil Counseling: Minoxidil is a topical medication which can increase blood flow where it is applied. It is uncertain how this medication increases hair growth. Side effects are uncommon and include stinging and allergic reactions. Cephalexin Counseling: I counseled the patient regarding use of cephalexin as an antibiotic for prophylactic and/or therapeutic purposes. Cephalexin (commonly prescribed under brand name Keflex) is a cephalosporin antibiotic which is active against numerous classes of bacteria, including most skin bacteria. Side effects may include nausea, diarrhea, gastrointestinal upset, rash, hives, yeast infections, and in rare cases, hepatitis, kidney disease, seizures, fever, confusion, neurologic symptoms, and others. Patients with severe allergies to penicillin medications are cautioned that there is about a 10% incidence of cross-reactivity with cephalosporins. When possible, patients with penicillin allergies should use alternatives to cephalosporins for antibiotic therapy. Topical Sulfur Applications Pregnancy And Lactation Text: This medication is Pregnancy Category C and has an unknown safety profile during pregnancy. It is unknown if this topical medication is excreted in breast milk. Prednisone Pregnancy And Lactation Text: This medication is Pregnancy Category C and it isn't know if it is safe during pregnancy. This medication is excreted in breast milk. Detail Level: Zone Albendazole Counseling:  I discussed with the patient the risks of albendazole including but not limited to cytopenia, kidney damage, nausea/vomiting and severe allergy.  The patient understands that this medication is being used in an off-label manner. Minoxidil Pregnancy And Lactation Text: This medication has not been assigned a Pregnancy Risk Category but animal studies failed to show danger with the topical medication. It is unknown if the medication is excreted in breast milk. Fluconazole Pregnancy And Lactation Text: This medication is Pregnancy Category C and it isn't know if it is safe during pregnancy. It is also excreted in breast milk. Birth Control Pills Counseling: Birth Control Pill Counseling: I discussed with the patient the potential side effects of OCPs including but not limited to increased risk of stroke, heart attack, thrombophlebitis, deep venous thrombosis, hepatic adenomas, breast changes, GI upset, headaches, and depression.  The patient verbalized understanding of the proper use and possible adverse effects of OCPs. All of the patient's questions and concerns were addressed. Ilumya Counseling: I discussed with the patient the risks of tildrakizumab including but not limited to immunosuppression, malignancy, posterior leukoencephalopathy syndrome, and serious infections.  The patient understands that monitoring is required including a PPD at baseline and must alert us or the primary physician if symptoms of infection or other concerning signs are noted. Carac Pregnancy And Lactation Text: This medication is Pregnancy Category X and contraindicated in pregnancy and in women who may become pregnant. It is unknown if this medication is excreted in breast milk. Cephalexin Pregnancy And Lactation Text: This medication is Pregnancy Category B and considered safe during pregnancy.  It is also excreted in breast milk but can be used safely for shorter doses. Zyclara Counseling:  I discussed with the patient the risks of imiquimod including but not limited to erythema, scaling, itching, weeping, crusting, and pain.  Patient understands that the inflammatory response to imiquimod is variable from person to person and was educated regarded proper titration schedule.  If flu-like symptoms develop, patient knows to discontinue the medication and contact us. Dapsone Pregnancy And Lactation Text: This medication is Pregnancy Category C and is not considered safe during pregnancy or breast feeding. 3 Xeljanz Counseling: I discussed with the patient the risks of Xeljanz therapy including increased risk of infection, liver issues, headache, diarrhea, or cold symptoms. Live vaccines should be avoided. They were instructed to call if they have any problems. 5-Fu Counseling: 5-Fluorouracil Counseling:  I discussed with the patient the risks of 5-fluorouracil including but not limited to erythema, scaling, itching, weeping, crusting, and pain. Griseofulvin Counseling:  I discussed with the patient the risks of griseofulvin including but not limited to photosensitivity, cytopenia, liver damage, nausea/vomiting and severe allergy.  The patient understands that this medication is best absorbed when taken with a fatty meal (e.g., ice cream or french fries). Albendazole Pregnancy And Lactation Text: This medication is Pregnancy Category C and it isn't known if it is safe during pregnancy. It is also excreted in breast milk. Picato Counseling:  I discussed with the patient the risks of Picato including but not limited to erythema, scaling, itching, weeping, crusting, and pain. Erivedge Counseling- I discussed with the patient the risks of Erivedge including but not limited to nausea, vomiting, diarrhea, constipation, weight loss, changes in the sense of taste, decreased appetite, muscle spasms, and hair loss.  The patient verbalized understanding of the proper use and possible adverse effects of Erivedge.  All of the patient's questions and concerns were addressed. Birth Control Pills Pregnancy And Lactation Text: This medication should be avoided if pregnant and for the first 30 days post-partum. Clindamycin Counseling: I counseled the patient regarding use of clindamycin as an antibiotic for prophylactic and/or therapeutic purposes. Clindamycin is active against numerous classes of bacteria, including skin bacteria. Side effects may include nausea, diarrhea, gastrointestinal upset, rash, hives, yeast infections, and in rare cases, colitis. Xeldorinaz Pregnancy And Lactation Text: This medication is Pregnancy Category D and is not considered safe during pregnancy.  The risk during breast feeding is also uncertain. Infliximab Counseling:  I discussed with the patient the risks of infliximab including but not limited to myelosuppression, immunosuppression, autoimmune hepatitis, demyelinating diseases, lymphoma, and serious infections.  The patient understands that monitoring is required including a PPD at baseline and must alert us or the primary physician if symptoms of infection or other concerning signs are noted. Erivedge Pregnancy And Lactation Text: This medication is Pregnancy Category X and is absolutely contraindicated during pregnancy. It is unknown if it is excreted in breast milk. Spironolactone Counseling: Patient advised regarding risks of diarrhea, abdominal pain, hyperkalemia, birth defects (for female patients), liver toxicity and renal toxicity. The patient may need blood work to monitor liver and kidney function and potassium levels while on therapy. The patient verbalized understanding of the proper use and possible adverse effects of spironolactone.  All of the patient's questions and concerns were addressed. Clindamycin Pregnancy And Lactation Text: This medication can be used in pregnancy if certain situations. Clindamycin is also present in breast milk. Acitretin Counseling:  I discussed with the patient the risks of acitretin including but not limited to hair loss, dry lips/skin/eyes, liver damage, hyperlipidemia, depression/suicidal ideation, photosensitivity.  Serious rare side effects can include but are not limited to pancreatitis, pseudotumor cerebri, bony changes, clot formation/stroke/heart attack.  Patient understands that alcohol is contraindicated since it can result in liver toxicity and significantly prolong the elimination of the drug by many years. Ivermectin Counseling:  Patient instructed to take medication on an empty stomach with a full glass of water.  Patient informed of potential adverse effects including but not limited to nausea, diarrhea, dizziness, itching, and swelling of the extremities or lymph nodes.  The patient verbalized understanding of the proper use and possible adverse effects of ivermectin.  All of the patient's questions and concerns were addressed. Griseofulvin Pregnancy And Lactation Text: This medication is Pregnancy Category X and is known to cause serious birth defects. It is unknown if this medication is excreted in breast milk but breast feeding should be avoided. Xolair Counseling:  Patient informed of potential adverse effects including but not limited to fever, muscle aches, rash and allergic reactions.  The patient verbalized understanding of the proper use and possible adverse effects of Xolair.  All of the patient's questions and concerns were addressed. Drysol Counseling:  I discussed with the patient the risks of drysol/aluminum chloride including but not limited to skin rash, itching, irritation, burning. Itraconazole Counseling:  I discussed with the patient the risks of itraconazole including but not limited to liver damage, nausea/vomiting, neuropathy, and severe allergy.  The patient understands that this medication is best absorbed when taken with acidic beverages such as non-diet cola or ginger ale.  The patient understands that monitoring is required including baseline LFTs and repeat LFTs at intervals.  The patient understands that they are to contact us or the primary physician if concerning signs are noted. Protopic Counseling: Patient may experience a mild burning sensation during topical application. Protopic is not approved in children less than 2 years of age. There have been case reports of hematologic and skin malignancies in patients using topical calcineurin inhibitors although causality is questionable. Doxycycline Counseling:  Patient counseled regarding possible photosensitivity and increased risk for sunburn.  Patient instructed to avoid sunlight, if possible.  When exposed to sunlight, patients should wear protective clothing, sunglasses, and sunscreen.  The patient was instructed to call the office immediately if the following severe adverse effects occur:  hearing changes, easy bruising/bleeding, severe headache, or vision changes.  The patient verbalized understanding of the proper use and possible adverse effects of doxycycline.  All of the patient's questions and concerns were addressed. Acitretin Pregnancy And Lactation Text: This medication is Pregnancy Category X and should not be given to women who are pregnant or may become pregnant in the future. This medication is excreted in breast milk. Gabapentin Counseling: I discussed with the patient the risks of gabapentin including but not limited to dizziness, somnolence, fatigue and ataxia. Spironolactone Pregnancy And Lactation Text: This medication can cause feminization of the male fetus and should be avoided during pregnancy. The active metabolite is also found in breast milk. Rituxan Counseling:  I discussed with the patient the risks of Rituxan infusions. Side effects can include infusion reactions, severe drug rashes including mucocutaneous reactions, reactivation of latent hepatitis and other infections and rarely progressive multifocal leukoencephalopathy.  All of the patient's questions and concerns were addressed. SSKI Counseling:  I discussed with the patient the risks of SSKI including but not limited to thyroid abnormalities, metallic taste, GI upset, fever, headache, acne, arthralgias, paraesthesias, lymphadenopathy, easy bleeding, arrhythmias, and allergic reaction. Include Pregnancy/Lactation Warning?: No Doxycycline Pregnancy And Lactation Text: This medication is Pregnancy Category D and not consider safe during pregnancy. It is also excreted in breast milk but is considered safe for shorter treatment courses. Xolair Pregnancy And Lactation Text: This medication is Pregnancy Category B and is considered safe during pregnancy. This medication is excreted in breast milk. Bexarotene Counseling:  I discussed with the patient the risks of bexarotene including but not limited to hair loss, dry lips/skin/eyes, liver abnormalities, hyperlipidemia, pancreatitis, depression/suicidal ideation, photosensitivity, drug rash/allergic reactions, hypothyroidism, anemia, leukopenia, infection, cataracts, and teratogenicity.  Patient understands that they will need regular blood tests to check lipid profile, liver function tests, white blood cell count, thyroid function tests and pregnancy test if applicable. Drysol Pregnancy And Lactation Text: This medication is considered safe during pregnancy and breast feeding. Protopic Pregnancy And Lactation Text: This medication is Pregnancy Category C. It is unknown if this medication is excreted in breast milk when applied topically. Sski Pregnancy And Lactation Text: This medication is Pregnancy Category D and isn't considered safe during pregnancy. It is excreted in breast milk. Rituxan Pregnancy And Lactation Text: This medication is Pregnancy Category C and it isn't know if it is safe during pregnancy. It is unknown if this medication is excreted in breast milk but similar antibodies are known to be excreted. Ketoconazole Counseling:   Patient counseled regarding improving absorption with orange juice.  Adverse effects include but are not limited to breast enlargement, headache, diarrhea, nausea, upset stomach, liver function test abnormalities, taste disturbance, and stomach pain.  There is a rare possibility of liver failure that can occur when taking ketoconazole. The patient understands that monitoring of LFTs may be required, especially at baseline. The patient verbalized understanding of the proper use and possible adverse effects of ketoconazole.  All of the patient's questions and concerns were addressed. Minocycline Counseling: Patient advised regarding possible photosensitivity and discoloration of the teeth, skin, lips, tongue and gums.  Patient instructed to avoid sunlight, if possible.  When exposed to sunlight, patients should wear protective clothing, sunglasses, and sunscreen.  The patient was instructed to call the office immediately if the following severe adverse effects occur:  hearing changes, easy bruising/bleeding, severe headache, or vision changes.  The patient verbalized understanding of the proper use and possible adverse effects of minocycline.  All of the patient's questions and concerns were addressed. Solaraze Counseling:  I discussed with the patient the risks of Solaraze including but not limited to erythema, scaling, itching, weeping, crusting, and pain. Bexarotene Pregnancy And Lactation Text: This medication is Pregnancy Category X and should not be given to women who are pregnant or may become pregnant. This medication should not be used if you are breast feeding. Siliq Counseling:  I discussed with the patient the risks of Siliq including but not limited to new or worsening depression, suicidal thoughts and behavior, immunosuppression, malignancy, posterior leukoencephalopathy syndrome, and serious infections.  The patient understands that monitoring is required including a PPD at baseline and must alert us or the primary physician if symptoms of infection or other concerning signs are noted. There is also a special program designed to monitor depression which is required with Siliq. Azathioprine Counseling:  I discussed with the patient the risks of azathioprine including but not limited to myelosuppression, immunosuppression, hepatotoxicity, lymphoma, and infections.  The patient understands that monitoring is required including baseline LFTs, Creatinine, possible TPMP genotyping and weekly CBCs for the first month and then every 2 weeks thereafter.  The patient verbalized understanding of the proper use and possible adverse effects of azathioprine.  All of the patient's questions and concerns were addressed. Glycopyrrolate Counseling:  I discussed with the patient the risks of glycopyrrolate including but not limited to skin rash, drowsiness, dry mouth, difficulty urinating, and blurred vision. Erythromycin Counseling:  I discussed with the patient the risks of erythromycin including but not limited to GI upset, allergic reaction, drug rash, diarrhea, increase in liver enzymes, and yeast infections. Erythromycin Pregnancy And Lactation Text: This medication is Pregnancy Category B and is considered safe during pregnancy. It is also excreted in breast milk. Glycopyrrolate Pregnancy And Lactation Text: This medication is Pregnancy Category B and is considered safe during pregnancy. It is unknown if it is excreted breast milk. Azathioprine Pregnancy And Lactation Text: This medication is Pregnancy Category D and isn't considered safe during pregnancy. It is unknown if this medication is excreted in breast milk. Ketoconazole Pregnancy And Lactation Text: This medication is Pregnancy Category C and it isn't know if it is safe during pregnancy. It is also excreted in breast milk and breast feeding isn't recommended. Thalidomide Counseling: I discussed with the patient the risks of thalidomide including but not limited to birth defects, anxiety, weakness, chest pain, dizziness, cough and severe allergy. Solaraze Pregnancy And Lactation Text: This medication is Pregnancy Category B and is considered safe. There is some data to suggest avoiding during the third trimester. It is unknown if this medication is excreted in breast milk. Isotretinoin Counseling: Patient should get monthly blood tests, not donate blood, not drive at night if vision affected, not share medication, and not undergo elective surgery for 6 months after tx completed. Side effects reviewed, pt to contact office should one occur. Metronidazole Counseling:  I discussed with the patient the risks of metronidazole including but not limited to seizures, nausea/vomiting, a metallic taste in the mouth, nausea/vomiting and severe allergy. Elidel Counseling: Patient may experience a mild burning sensation during topical application. Elidel is not approved in children less than 2 years of age. There have been case reports of hematologic and skin malignancies in patients using topical calcineurin inhibitors although causality is questionable. Cimzia Counseling:  I discussed with the patient the risks of Cimzia including but not limited to immunosuppression, allergic reactions and infections.  The patient understands that monitoring is required including a PPD at baseline and must alert us or the primary physician if symptoms of infection or other concerning signs are noted. Quinolones Counseling:  I discussed with the patient the risks of fluoroquinolones including but not limited to GI upset, allergic reaction, drug rash, diarrhea, dizziness, photosensitivity, yeast infections, liver function test abnormalities, tendonitis/tendon rupture. Terbinafine Counseling: Patient counseling regarding adverse effects of terbinafine including but not limited to headache, diarrhea, rash, upset stomach, liver function test abnormalities, itching, taste/smell disturbance, nausea, abdominal pain, and flatulence.  There is a rare possibility of liver failure that can occur when taking terbinafine.  The patient understands that a baseline LFT and kidney function test may be required. The patient verbalized understanding of the proper use and possible adverse effects of terbinafine.  All of the patient's questions and concerns were addressed. Cellcept Counseling:  I discussed with the patient the risks of mycophenolate mofetil including but not limited to infection/immunosuppression, GI upset, hypokalemia, hypercholesterolemia, bone marrow suppression, lymphoproliferative disorders, malignancy, GI ulceration/bleed/perforation, colitis, interstitial lung disease, kidney failure, progressive multifocal leukoencephalopathy, and birth defects.  The patient understands that monitoring is required including a baseline creatinine and regular CBC testing. In addition, patient must alert us immediately if symptoms of infection or other concerning signs are noted. Cimzia Pregnancy And Lactation Text: This medication crosses the placenta but can be considered safe in certain situations. Cimzia may be excreted in breast milk. Simponi Counseling:  I discussed with the patient the risks of golimumab including but not limited to myelosuppression, immunosuppression, autoimmune hepatitis, demyelinating diseases, lymphoma, and serious infections.  The patient understands that monitoring is required including a PPD at baseline and must alert us or the primary physician if symptoms of infection or other concerning signs are noted. Hydroxychloroquine Counseling:  I discussed with the patient that a baseline ophthalmologic exam is needed at the start of therapy and every year thereafter while on therapy. A CBC may also be warranted for monitoring.  The side effects of this medication were discussed with the patient, including but not limited to agranulocytosis, aplastic anemia, seizures, rashes, retinopathy, and liver toxicity. Patient instructed to call the office should any adverse effect occur.  The patient verbalized understanding of the proper use and possible adverse effects of Plaquenil.  All the patient's questions and concerns were addressed. Topical Retinoid counseling:  Patient advised to apply a pea-sized amount only at bedtime and wait 30 minutes after washing their face before applying.  If too drying, patient may add a non-comedogenic moisturizer. The patient verbalized understanding of the proper use and possible adverse effects of retinoids.  All of the patient's questions and concerns were addressed. Terbinafine Pregnancy And Lactation Text: This medication is Pregnancy Category B and is considered safe during pregnancy. It is also excreted in breast milk and breast feeding isn't recommended. Valtrex Counseling: I discussed with the patient the risks of valacyclovir including but not limited to kidney damage, nausea, vomiting and severe allergy.  The patient understands that if the infection seems to be worsening or is not improving, they are to call. Valtrex Pregnancy And Lactation Text: this medication is Pregnancy Category B and is considered safe during pregnancy. This medication is not directly found in breast milk but it's metabolite acyclovir is present. Hydroxychloroquine Pregnancy And Lactation Text: This medication has been shown to cause fetal harm but it isn't assigned a Pregnancy Risk Category. There are small amounts excreted in breast milk. Metronidazole Pregnancy And Lactation Text: This medication is Pregnancy Category B and considered safe during pregnancy.  It is also excreted in breast milk. Nsaids Counseling: NSAID Counseling: I discussed with the patient that NSAIDs should be taken with food. Prolonged use of NSAIDs can result in the development of stomach ulcers.  Patient advised to stop taking NSAIDs if abdominal pain occurs.  The patient verbalized understanding of the proper use and possible adverse effects of NSAIDs.  All of the patient's questions and concerns were addressed. Rifampin Counseling: I discussed with the patient the risks of rifampin including but not limited to liver damage, kidney damage, red-orange body fluids, nausea/vomiting and severe allergy. Cosentyx Counseling:  I discussed with the patient the risks of Cosentyx including but not limited to worsening of Crohn's disease, immunosuppression, allergic reactions and infections.  The patient understands that monitoring is required including a PPD at baseline and must alert us or the primary physician if symptoms of infection or other concerning signs are noted. Eucrisa Counseling: Patient may experience a mild burning sensation during topical application. Eucrisa is not approved in children less than 2 years of age. Arava Counseling:  Patient counseled regarding adverse effects of Arava including but not limited to nausea, vomiting, abnormalities in liver function tests. Patients may develop mouth sores, rash, diarrhea, and abnormalities in blood counts. The patient understands that monitoring is required including LFTs and blood counts.  There is a rare possibility of scarring of the liver and lung problems that can occur when taking methotrexate. Persistent nausea, loss of appetite, pale stools, dark urine, cough, and shortness of breath should be reported immediately. Patient advised to discontinue Arava treatment and consult with a physician prior to attempting conception. The patient will have to undergo a treatment to eliminate Arava from the body prior to conception. Skyrizi Counseling: I discussed with the patient the risks of risankizumab-rzaa including but not limited to immunosuppression, and serious infections.  The patient understands that monitoring is required including a PPD at baseline and must alert us or the primary physician if symptoms of infection or other concerning signs are noted. Cyclophosphamide Counseling:  I discussed with the patient the risks of cyclophosphamide including but not limited to hair loss, hormonal abnormalities, decreased fertility, abdominal pain, diarrhea, nausea and vomiting, bone marrow suppression and infection. The patient understands that monitoring is required while taking this medication. Isotretinoin Pregnancy And Lactation Text: This medication is Pregnancy Category X and is considered extremely dangerous during pregnancy. It is unknown if it is excreted in breast milk. Tazorac Counseling:  Patient advised that medication is irritating and drying.  Patient may need to apply sparingly and wash off after an hour before eventually leaving it on overnight.  The patient verbalized understanding of the proper use and possible adverse effects of tazorac.  All of the patient's questions and concerns were addressed. Cyclophosphamide Pregnancy And Lactation Text: This medication is Pregnancy Category D and it isn't considered safe during pregnancy. This medication is excreted in breast milk. Cimetidine Counseling:  I discussed with the patient the risks of Cimetidine including but not limited to gynecomastia, headache, diarrhea, nausea, drowsiness, arrhythmias, pancreatitis, skin rashes, psychosis, bone marrow suppression and kidney toxicity. Rifampin Pregnancy And Lactation Text: This medication is Pregnancy Category C and it isn't know if it is safe during pregnancy. It is also excreted in breast milk and should not be used if you are breast feeding. Nsaids Pregnancy And Lactation Text: These medications are considered safe up to 30 weeks gestation. It is excreted in breast milk. Dupixent Counseling: I discussed with the patient the risks of dupilumab including but not limited to eye infection and irritation, cold sores, injection site reactions, worsening of asthma, allergic reactions and increased risk of parasitic infection.  Live vaccines should be avoided while taking dupilumab. Dupilumab will also interact with certain medications such as warfarin and cyclosporine. The patient understands that monitoring is required and they must alert us or the primary physician if symptoms of infection or other concerning signs are noted. High Dose Vitamin A Counseling: Side effects reviewed, pt to contact office should one occur. Odomzo Counseling- I discussed with the patient the risks of Odomzo including but not limited to nausea, vomiting, diarrhea, constipation, weight loss, changes in the sense of taste, decreased appetite, muscle spasms, and hair loss.  The patient verbalized understanding of the proper use and possible adverse effects of Odomzo.  All of the patient's questions and concerns were addressed. Clofazimine Counseling:  I discussed with the patient the risks of clofazimine including but not limited to skin and eye pigmentation, liver damage, nausea/vomiting, gastrointestinal bleeding and allergy. Sarecycline Counseling: Patient advised regarding possible photosensitivity and discoloration of the teeth, skin, lips, tongue and gums.  Patient instructed to avoid sunlight, if possible.  When exposed to sunlight, patients should wear protective clothing, sunglasses, and sunscreen.  The patient was instructed to call the office immediately if the following severe adverse effects occur:  hearing changes, easy bruising/bleeding, severe headache, or vision changes.  The patient verbalized understanding of the proper use and possible adverse effects of sarecycline.  All of the patient's questions and concerns were addressed. Tazorac Pregnancy And Lactation Text: This medication is not safe during pregnancy. It is unknown if this medication is excreted in breast milk. Cyclosporine Counseling:  I discussed with the patient the risks of cyclosporine including but not limited to hypertension, gingival hyperplasia,myelosuppression, immunosuppression, liver damage, kidney damage, neurotoxicity, lymphoma, and serious infections. The patient understands that monitoring is required including baseline blood pressure, CBC, CMP, lipid panel and uric acid, and then 1-2 times monthly CMP and blood pressure. Stelara Counseling:  I discussed with the patient the risks of ustekinumab including but not limited to immunosuppression, malignancy, posterior leukoencephalopathy syndrome, and serious infections.  The patient understands that monitoring is required including a PPD at baseline and must alert us or the primary physician if symptoms of infection or other concerning signs are noted. Azithromycin Counseling:  I discussed with the patient the risks of azithromycin including but not limited to GI upset, allergic reaction, drug rash, diarrhea, and yeast infections. High Dose Vitamin A Pregnancy And Lactation Text: High dose vitamin A therapy is contraindicated during pregnancy and breast feeding. Dupixent Pregnancy And Lactation Text: This medication likely crosses the placenta but the risk for the fetus is uncertain. This medication is excreted in breast milk. Hydroquinone Counseling:  Patient advised that medication may result in skin irritation, lightening (hypopigmentation), dryness, and burning.  In the event of skin irritation, the patient was advised to reduce the amount of the drug applied or use it less frequently.  Rarely, spots that are treated with hydroquinone can become darker (pseudoochronosis).  Should this occur, patient instructed to stop medication and call the office. The patient verbalized understanding of the proper use and possible adverse effects of hydroquinone.  All of the patient's questions and concerns were addressed. Doxepin Counseling:  Patient advised that the medication is sedating and not to drive a car after taking this medication. Patient informed of potential adverse effects including but not limited to dry mouth, urinary retention, and blurry vision.  The patient verbalized understanding of the proper use and possible adverse effects of doxepin.  All of the patient's questions and concerns were addressed. Topical Clindamycin Counseling: Patient counseled that this medication may cause skin irritation or allergic reactions.  In the event of skin irritation, the patient was advised to reduce the amount of the drug applied or use it less frequently.   The patient verbalized understanding of the proper use and possible adverse effects of clindamycin.  All of the patient's questions and concerns were addressed. Doxepin Pregnancy And Lactation Text: This medication is Pregnancy Category C and it isn't known if it is safe during pregnancy. It is also excreted in breast milk and breast feeding isn't recommended. Tetracycline Counseling: Patient counseled regarding possible photosensitivity and increased risk for sunburn.  Patient instructed to avoid sunlight, if possible.  When exposed to sunlight, patients should wear protective clothing, sunglasses, and sunscreen.  The patient was instructed to call the office immediately if the following severe adverse effects occur:  hearing changes, easy bruising/bleeding, severe headache, or vision changes.  The patient verbalized understanding of the proper use and possible adverse effects of tetracycline.  All of the patient's questions and concerns were addressed. Patient understands to avoid pregnancy while on therapy due to potential birth defects. Azithromycin Pregnancy And Lactation Text: This medication is considered safe during pregnancy and is also secreted in breast milk. Enbrel Counseling:  I discussed with the patient the risks of etanercept including but not limited to myelosuppression, immunosuppression, autoimmune hepatitis, demyelinating diseases, lymphoma, and infections.  The patient understands that monitoring is required including a PPD at baseline and must alert us or the primary physician if symptoms of infection or other concerning signs are noted.

## 2021-09-22 ENCOUNTER — EMERGENCY (EMERGENCY)
Facility: HOSPITAL | Age: 71
LOS: 1 days | Discharge: HOME | End: 2021-09-22
Attending: EMERGENCY MEDICINE
Payer: COMMERCIAL

## 2021-09-22 VITALS
OXYGEN SATURATION: 97 % | TEMPERATURE: 98 F | RESPIRATION RATE: 16 BRPM | SYSTOLIC BLOOD PRESSURE: 146 MMHG | HEIGHT: 63 IN | HEART RATE: 72 BPM | WEIGHT: 125 LBS | DIASTOLIC BLOOD PRESSURE: 84 MMHG

## 2021-09-22 DIAGNOSIS — Z98.890 OTHER SPECIFIED POSTPROCEDURAL STATES: Chronic | ICD-10-CM

## 2021-09-22 DIAGNOSIS — Z98.891 HISTORY OF UTERINE SCAR FROM PREVIOUS SURGERY: Chronic | ICD-10-CM

## 2021-09-22 LAB
ALBUMIN SERPL ELPH-MCNC: 4.3 G/DL — SIGNIFICANT CHANGE UP (ref 3.3–5)
ALP SERPL-CCNC: 59 U/L — SIGNIFICANT CHANGE UP (ref 40–120)
ALT FLD-CCNC: 20 U/L — SIGNIFICANT CHANGE UP (ref 10–45)
ANION GAP SERPL CALC-SCNC: 14 MMOL/L — SIGNIFICANT CHANGE UP (ref 5–17)
AST SERPL-CCNC: 16 U/L — SIGNIFICANT CHANGE UP (ref 10–40)
BASOPHILS # BLD AUTO: 0.03 K/UL — SIGNIFICANT CHANGE UP (ref 0–0.2)
BASOPHILS NFR BLD AUTO: 0.3 % — SIGNIFICANT CHANGE UP (ref 0–2)
BILIRUB SERPL-MCNC: 0.4 MG/DL — SIGNIFICANT CHANGE UP (ref 0.2–1.2)
BUN SERPL-MCNC: 31 MG/DL — HIGH (ref 7–23)
CALCIUM SERPL-MCNC: 9.6 MG/DL — SIGNIFICANT CHANGE UP (ref 8.4–10.5)
CHLORIDE SERPL-SCNC: 105 MMOL/L — SIGNIFICANT CHANGE UP (ref 96–108)
CO2 SERPL-SCNC: 24 MMOL/L — SIGNIFICANT CHANGE UP (ref 22–31)
CREAT SERPL-MCNC: 1.15 MG/DL — SIGNIFICANT CHANGE UP (ref 0.5–1.3)
EOSINOPHIL # BLD AUTO: 0.01 K/UL — SIGNIFICANT CHANGE UP (ref 0–0.5)
EOSINOPHIL NFR BLD AUTO: 0.1 % — SIGNIFICANT CHANGE UP (ref 0–6)
GLUCOSE SERPL-MCNC: 120 MG/DL — HIGH (ref 70–99)
HCT VFR BLD CALC: 43.8 % — SIGNIFICANT CHANGE UP (ref 34.5–45)
HGB BLD-MCNC: 14.4 G/DL — SIGNIFICANT CHANGE UP (ref 11.5–15.5)
IMM GRANULOCYTES NFR BLD AUTO: 0.4 % — SIGNIFICANT CHANGE UP (ref 0–1.5)
LYMPHOCYTES # BLD AUTO: 2.13 K/UL — SIGNIFICANT CHANGE UP (ref 1–3.3)
LYMPHOCYTES # BLD AUTO: 20.8 % — SIGNIFICANT CHANGE UP (ref 13–44)
MAGNESIUM SERPL-MCNC: 2.2 MG/DL — SIGNIFICANT CHANGE UP (ref 1.6–2.6)
MCHC RBC-ENTMCNC: 28.5 PG — SIGNIFICANT CHANGE UP (ref 27–34)
MCHC RBC-ENTMCNC: 32.9 GM/DL — SIGNIFICANT CHANGE UP (ref 32–36)
MCV RBC AUTO: 86.6 FL — SIGNIFICANT CHANGE UP (ref 80–100)
MONOCYTES # BLD AUTO: 0.76 K/UL — SIGNIFICANT CHANGE UP (ref 0–0.9)
MONOCYTES NFR BLD AUTO: 7.4 % — SIGNIFICANT CHANGE UP (ref 2–14)
NEUTROPHILS # BLD AUTO: 7.29 K/UL — SIGNIFICANT CHANGE UP (ref 1.8–7.4)
NEUTROPHILS NFR BLD AUTO: 71 % — SIGNIFICANT CHANGE UP (ref 43–77)
NRBC # BLD: 0 /100 WBCS — SIGNIFICANT CHANGE UP (ref 0–0)
PHOSPHATE SERPL-MCNC: 4.8 MG/DL — HIGH (ref 2.5–4.5)
PLATELET # BLD AUTO: 286 K/UL — SIGNIFICANT CHANGE UP (ref 150–400)
POTASSIUM SERPL-MCNC: 4 MMOL/L — SIGNIFICANT CHANGE UP (ref 3.5–5.3)
POTASSIUM SERPL-SCNC: 4 MMOL/L — SIGNIFICANT CHANGE UP (ref 3.5–5.3)
PROT SERPL-MCNC: 7 G/DL — SIGNIFICANT CHANGE UP (ref 6–8.3)
RBC # BLD: 5.06 M/UL — SIGNIFICANT CHANGE UP (ref 3.8–5.2)
RBC # FLD: 13.3 % — SIGNIFICANT CHANGE UP (ref 10.3–14.5)
SODIUM SERPL-SCNC: 143 MMOL/L — SIGNIFICANT CHANGE UP (ref 135–145)
TROPONIN T, HIGH SENSITIVITY RESULT: 7 NG/L — SIGNIFICANT CHANGE UP (ref 0–51)
WBC # BLD: 10.26 K/UL — SIGNIFICANT CHANGE UP (ref 3.8–10.5)
WBC # FLD AUTO: 10.26 K/UL — SIGNIFICANT CHANGE UP (ref 3.8–10.5)

## 2021-09-22 PROCEDURE — 83735 ASSAY OF MAGNESIUM: CPT

## 2021-09-22 PROCEDURE — 70450 CT HEAD/BRAIN W/O DYE: CPT | Mod: 26,MD

## 2021-09-22 PROCEDURE — 84484 ASSAY OF TROPONIN QUANT: CPT

## 2021-09-22 PROCEDURE — 99284 EMERGENCY DEPT VISIT MOD MDM: CPT | Mod: 25

## 2021-09-22 PROCEDURE — 80053 COMPREHEN METABOLIC PANEL: CPT

## 2021-09-22 PROCEDURE — 85025 COMPLETE CBC W/AUTO DIFF WBC: CPT

## 2021-09-22 PROCEDURE — 70450 CT HEAD/BRAIN W/O DYE: CPT | Mod: MD

## 2021-09-22 PROCEDURE — 93010 ELECTROCARDIOGRAM REPORT: CPT

## 2021-09-22 PROCEDURE — 84100 ASSAY OF PHOSPHORUS: CPT

## 2021-09-22 PROCEDURE — 36415 COLL VENOUS BLD VENIPUNCTURE: CPT

## 2021-09-22 PROCEDURE — 93005 ELECTROCARDIOGRAM TRACING: CPT

## 2021-09-22 PROCEDURE — 99285 EMERGENCY DEPT VISIT HI MDM: CPT

## 2021-09-22 NOTE — ED PROVIDER NOTE - PHYSICAL EXAMINATION
alert and oriented, non focal exam neurology  no acute distress  clear lungs S1-2  well perfused  no carotid bruits  soft abd, no ttp  no peripheral edema no calf pain

## 2021-09-22 NOTE — ED PROVIDER NOTE - NSICDXPASTSURGICALHX_GEN_ALL_CORE_FT
PAST SURGICAL HISTORY:  H/O umbilical hernia repair     History of  section x2    S/P left knee arthroscopy     S/P right knee arthroscopy

## 2021-09-22 NOTE — ED PROVIDER NOTE - RAPID ASSESSMENT
71y F on Xarelto presents to ED s/p syncope earlier today. +head injury.  Went to see her cardiology where they performed EKG and sent for D-dimer. Cards put her on holter monitory and referred to ED for CT scan.     Patient was seen as a tele QDOC patient. The patient will be seen and further worked up in the main emergency department and their care will be completed by the main emergency department team along with a thorough physical exam. Receiving team will follow up on labs, analgesia, any clinical imaging, reassess and disposition as clinically indicated, all decisions regarding the progression of care will be made at their discretion.    Scribe Statement: Denton LATHAM, attest that this documentation has been prepared under the direction and in the presence of Ludwig Monroe (PA) 71y F on Xarelto presents to ED s/p syncope earlier today. +head injury.  Went to see her cardiology where they performed EKG and sent for D-dimer. Cards put her on holter monitory and referred to ED for CT scan.     Patient was seen as a tele QDOC patient. The patient will be seen and further worked up in the main emergency department and their care will be completed by the main emergency department team along with a thorough physical exam. Receiving team will follow up on labs, analgesia, any clinical imaging, reassess and disposition as clinically indicated, all decisions regarding the progression of care will be made at their discretion.    Scribe Statement: Denton LATHAM, attest that this documentation has been prepared under the direction and in the presence of Ludwig Monroe (PA)  Ludwig LATHAM, personally performed the service described in the documentation recorded by the scribe in my presence, and it accurately and completely records my words and actions.

## 2021-09-22 NOTE — ED ADULT NURSE NOTE - OBJECTIVE STATEMENT
70 yo female AAOx3 with hx Afib on Xarelto presents to ED from Cardiologist s/p fall this morning. Patient reports feeling cramps in b/l toes this morning, got up to go to the bathroom and got lightheaded was associated with nausea and sweaty. Patient passed out, LOC "for seconds", hit head on carpet, no swelling or bleeding noted to back of head. EKG completed, patient placed on cardiac monitor. +ROMx4 extremities, +strong hand  b/l, no numbness/tingling. Denies fevers/chills, CP, palpitations, dizziness, SOB, abdominal pain. Safety measures maintained with bed in low position and side rails. 70 yo female AAOx3 with hx Afib on Xarelto presents to ED from Cardiologist s/p fall this morning. Patient reports feeling cramps in b/l toes this morning, got up to go to the bathroom and got lightheaded was associated with nausea and sweaty. Patient passed out, LOC "for seconds", hit head on carpet, no swelling or bleeding noted to back of head. EKG completed, patient placed on cardiac monitor. +ROMx4 extremities, +strong hand  b/l, no headache, blurry vision, numbness/tingling. Denies fevers/chills, CP, palpitations, dizziness, SOB, abdominal pain, dysuria. Safety measures maintained with bed in low position and side rails. 70 yo female AAOx3 with hx Afib on Xarelto presents to ED from Cardiologist s/p fall this morning. Patient reports feeling cramps in b/l toes this morning, got up to go to the bathroom and got lightheaded was associated with nausea and sweaty. Patient passed out, LOC "for seconds", hit head on carpet, no swelling or bleeding noted to back of head, c/o neck pain at this time. EKG completed, patient placed on cardiac monitor. +ROMx4 extremities, +strong hand  b/l, no headache, blurry vision, numbness/tingling. Denies fevers/chills, CP, palpitations, dizziness, SOB, abdominal pain, dysuria. Safety measures maintained with bed in low position and side rails. 70 yo female AAOx3 with hx Afib on Xarelto presents to ED from Cardiologist s/p fall this morning. Patient reports feeling cramps in b/l toes this morning, got up to go to the bathroom and got lightheaded was associated with nausea and sweaty. Patient passed out, LOC "for seconds", hit head on carpet, no swelling or bleeding noted to back of head, c/o neck pain at this time. EKG completed, patient placed on cardiac monitor. Patient ambulated back from triage, +ROMx4 extremities, +strong hand  b/l, no headache, blurry vision, numbness/tingling. Denies fevers/chills, CP, palpitations, dizziness, SOB, abdominal pain, dysuria. Safety measures maintained with bed in low position and side rails.

## 2021-09-22 NOTE — ED PROVIDER NOTE - NSICDXPASTMEDICALHX_GEN_ALL_CORE_FT
PAST MEDICAL HISTORY:  Atrial fibrillation on xarelto    Eczema     Hypertension     Osteopenia

## 2021-09-22 NOTE — ED PROVIDER NOTE - CLINICAL SUMMARY MEDICAL DECISION MAKING FREE TEXT BOX
71yr F hx of afib on xarelto w syncopal episode here for [facility] eval. exam non focal including neuro. labs unremarkable. ekg non ischemic. has holter monitor on. head CT oredered from triage and no acute bleed. discussed with her regarding concern for cardiogenic source. no concern for seizure or neurogenic. she insists she prefer going home and followin up as outpt. discussed risks and benefits with her and attempted to reach her cardiologist by phone but unable to.

## 2021-09-22 NOTE — ED PROVIDER NOTE - PROGRESS NOTE DETAILS
Simin Washburn, PGY-1  Emergency Medicine  called pt's cardiologist to discuss the pt's plan and our interest in keeping the pt overnight for tele monitoring, will discuss with pt. awaiting call back. Simin Washburn, PGY-1  Emergency Medicine  had long conversation with pt regarding our recommendation for her to stay and be admitted for continuous monitoring. Pt does not want to stay, is wearing a holter monitor and has a follow up appointment on friday. discussed the risk of injuring herself due to a syncopal event along with other catastrophic events. Pt verbalizes understanding and still would like to be discharged. discussed return precautions, pt aware of risks.

## 2021-09-22 NOTE — ED PROVIDER NOTE - PATIENT PORTAL LINK FT
You can access the FollowMyHealth Patient Portal offered by [facility] by registering at the following website: [email or web address]. By joining [facility]’s FollowMyHealth portal, you will also be able to view your health information using other applications (apps) compatible with our system.

## 2021-09-22 NOTE — ED PROVIDER NOTE - NSFOLLOWUPINSTRUCTIONS_ED_ALL_ED_FT
70 yo female presenting after an episode of syncope today. This is most concerning for an arrhythmia, vasovagal, intracranial hemorrhage. will order non con CT, labs, ecg. [facility]  Cardiology  [address] [place] [address]  Phone: [phone]  Fax:   Follow Up Time:     [facility]  Cardiology  [address], [address]  [place] [address]  Phone: [phone]  Fax:   Follow Up Time:     [facility]  Cardiology  [address] [place] [address]  Phone: [phone]  Fax:   Follow Up Time:     Near Syncope    WHAT YOU NEED TO KNOW:    Near syncope, also called presyncope, is the feeling that you may faint (lose consciousness), but you do not. You can control some health conditions that cause near syncope. Your healthcare providers can help you create a plan to manage near syncope and prevent episodes.    DISCHARGE INSTRUCTIONS:    Seek care immediately if:    You have sudden chest pain.    You have trouble breathing or shortness of breath.    You have vision changes, are sweating, and have nausea while you are sitting or lying down.    You feel dizzy or flushed and your heart is fluttering.    You lose consciousness.    You cannot use your arm, hand, foot, or leg, or it feels weak.    You have trouble speaking or understanding others when they speak.  Contact your healthcare provider if:    You have new or worsening symptoms.    Your heart beats faster or slower than usual.    You have questions or concerns about your condition or care.  Medicines:    Medicines may be needed to help your heart pump strongly and regularly. Your healthcare provider may also make changes to any medicines that are causing syncope.    Take your medicine as directed. Contact your healthcare provider if you think your medicine is not helping or if you have side effects. Tell him or her if you are allergic to any medicine. Keep a list of the medicines, vitamins, and herbs you take. Include the amounts, and when and why you take them. Bring the list or the pill bottles to follow-up visits. Carry your medicine list with you in case of an emergency.  Follow up with your healthcare provider as directed: You may need more tests to help find the cause of your near syncope episodes. The tests will help healthcare providers plan the best treatment for you. Write down your questions so you remember to ask them during your visits.    Manage near syncope:    Sit or lie down when needed. This includes when you feel dizzy, your throat is getting tight, and your vision changes. Raise your legs above the level of your heart.    Take slow, deep breaths if you start to breathe faster with anxiety or fear. This can help decrease dizziness and the feeling that you might faint.    Keep a record of your near syncope episodes. Include your symptoms and your activity before and after the episode. The record can help your healthcare provider find the cause of your near syncope and help you manage episodes.  Prevent a near syncope episode:    Move slowly and let yourself get used to one position before you move to another position. This is very important when you change from a lying or sitting position to a standing position. Take some deep breaths before you stand up from a lying position. Stand up slowly. Sudden movements may cause a fainting spell. Sit on the side of the bed or couch for a few minutes before you stand up. If you are on bedrest, try to be upright for about 2 hours each day, or as directed. Do not lock your legs if you are standing for a long period of time. Move your legs and bend your knees to keep blood flowing.    Follow your healthcare provider's recommendations. Your provider may recommend that you drink more liquids to prevent dehydration. You may also need to have more salt to keep your blood pressure from dropping too low and causing syncope. Your provider will tell you how much liquid and sodium to have each day. He or she will also tell you how much physical activity is safe for you. This will depend on what is causing your near syncope.    Watch for signs of low blood sugar. These include hunger, nervousness, sweating, and fast or fluttery heartbeats. Talk with your healthcare provider about ways to keep your blood sugar level steady.    Check your blood pressure often. This is important if you take medicine to lower your blood pressure. Check your blood pressure when you are lying down and when you are standing. Ask how often to check during the day. Keep a record of your blood pressure numbers. Your healthcare provider may use the record to help plan your treatment.  How to take a Blood Pressure      Do not strain if you are constipated. You may faint if you strain to have a bowel movement. Walking is the best way to get your bowels moving. Eat foods high in fiber to make it easier to have a bowel movement. Good examples are high-fiber cereals, beans, vegetables, and whole-grain breads. Prune juice may help make bowel movements softer.

## 2021-09-22 NOTE — ED PROVIDER NOTE - OBJECTIVE STATEMENT
71yr F hx of afib on xarelto, w a syncope episode earlier today, seen PCP and cards and had USDVT, ekg and a holter placed and sent here for head CT. reports waking up diaphoretic and wanted to use the bathroom when felt lightheaded and passed out. no bleeding. currently no sx. compliant w meds and no infectious sx.   plans to leave on Sat for a cruise trip.

## 2021-09-22 NOTE — ED PROVIDER NOTE - NSICDXFAMILYHX_GEN_ALL_CORE_FT
FAMILY HISTORY:  Father  Still living? Unknown  Family history of atrial fibrillation, Age at diagnosis: Age Unknown    Mother  Still living? Unknown  Family history of atrial fibrillation, Age at diagnosis: Age Unknown  Family history of stroke, Age at diagnosis: Age Unknown    Sibling  Still living? Unknown  Family history of breast cancer, Age at diagnosis: Age Unknown  Hypertension, Age at diagnosis: Age Unknown

## 2021-09-23 VITALS
RESPIRATION RATE: 18 BRPM | TEMPERATURE: 98 F | HEART RATE: 59 BPM | SYSTOLIC BLOOD PRESSURE: 138 MMHG | OXYGEN SATURATION: 98 % | DIASTOLIC BLOOD PRESSURE: 91 MMHG

## 2021-11-23 ENCOUNTER — TRANSCRIPTION ENCOUNTER (OUTPATIENT)
Age: 71
End: 2021-11-23

## 2022-06-23 ENCOUNTER — APPOINTMENT (OUTPATIENT)
Dept: ORTHOPEDIC SURGERY | Facility: CLINIC | Age: 72
End: 2022-06-23
Payer: MEDICARE

## 2022-06-23 VITALS — WEIGHT: 225 LBS | HEIGHT: 64 IN | BODY MASS INDEX: 38.41 KG/M2

## 2022-06-23 DIAGNOSIS — M19.012 PRIMARY OSTEOARTHRITIS, LEFT SHOULDER: ICD-10-CM

## 2022-06-23 PROCEDURE — J3490M: CUSTOM

## 2022-06-23 PROCEDURE — 73010 X-RAY EXAM OF SHOULDER BLADE: CPT | Mod: 50

## 2022-06-23 PROCEDURE — 20611 DRAIN/INJ JOINT/BURSA W/US: CPT

## 2022-06-23 PROCEDURE — 99214 OFFICE O/P EST MOD 30 MIN: CPT | Mod: 25

## 2022-06-23 PROCEDURE — 73030 X-RAY EXAM OF SHOULDER: CPT | Mod: 50

## 2022-06-23 NOTE — ASSESSMENT
[FreeTextEntry1] : Bilateral X-Ray Examination of the SHOULDER (2 views):  No fractures, subluxations or dislocations. deg changes gh joint with inf spur\par X-Ray Examination of the SCAPULA 1 or 2 views shows: No significant abnormalities.  Acromial spur. \par \par - We discussed their diagnosis and treatment options at length. \par - We will continue conservative treatment with a course of PT, icing, and anti-inflammatory medication.\par - The patient was provided with a prescription to work on scapular strengthening and rotator cuff strengthening.\par - The patient was advised to let pain guide the gradual advancement of activities. \par - We also discussed the possible of a corticosteroid injection in order to help decrease inflammation and pain so that they can perform better therapy.\par - The risks, benefits, and alternatives to corticosteroid injection were reviewed with the patient and they wished to proceed with this treatment course. \par - r CSI\par - Follow up as needed may evnetualyl need TSA\par

## 2022-06-23 NOTE — PROCEDURE
[FreeTextEntry3] : \par Injection Procedure Note:\par \par The risks, benefits, and alternatives to corticosteroid injection were reviewed with the patient.  Risks outlined include but are not limited to infection, sepsis, bleeding, scarring, skin discoloration, temporary increase in pain, syncopal episode, failure to resolve symptoms, symptoms recurrence, allergic reaction, flare reaction, and elevation of blood sugar in diabetics.  Patient understood the risks and asked to proceed with this treatment course.\par \par Patient Identification\par Name/: Verbal with patient and/or family\par \par Procedure Verification:\par Procedure confirmed with patient or family/designee\par Consent for procedure: Verbal Consent Given\par Relevant documentation completed, reviewed, and signed\par Clinical indications for procedure confirmed\par \par Time-out with all members of procedure team immediately prior to procedure:\par Correct patient identified. Agreement on procedure. Correct side and site.\par \par ULTRASOUND GUIDED SHOULDER GLENOHUMERAL INJECTION - RIGHT \par After verbal consent and identification of the correct patient and correct site, the posterior right shoulder was prepped using alcohol swabs and betadine. This was allowed time to air dry. After ethyl choride spray for skin anesthesia, a mixture of 1cc DepoMedrol 40mg/ml, 3cc Lidocaine 1%, and 3cc Bupivacaine 0.5% was injected under ultrasound guidance into the glenohumeral joint from posterior using a sterile 22G needle. Visualization of the needle and placement of the injection was performed without any complications.  The patient tolerated the procedure well. After-care instructions were provided and included instructions to ice the area and to call if redness, pain, or fever develop.\par

## 2022-06-23 NOTE — IMAGING
[de-identified] : \par RIGHT SHOULDER\par Inspection: No swelling. \par Palpation: Tenderness is noted at the bicipital groove, anterior and lateral. \par Range of motion: There is pain with range of motion.\par , ER 55, @90ER 70, @90IR 30\par Strength: There is pain and discomfort with strength testing.\par Forward Flexion 4/5. Abduction 4/5.  External Rotation 5-/5 and Internal Rotation 5/5 \par Neurological testings: motor and sensor intact distally.\par Ligament Stability and Special Tests: \par There is positive arc of pain. \par Shoulder apprehension: neg\par Shoulder relocation: neg\par Gunter’s test: pos\par Biceps Active test: neg\par Johnson Labral Shear: neg\par Impingement testing: pos\par Alisha testing: pos\par Whipple: pos\par Cross Body Adduction: neg\par \par \par LEFT SHOULDER\par Inspection: No swelling. \par Palpation: Tenderness is noted at the bicipital groove, anterior and lateral. \par Range of motion: There is pain with range of motion.\par , ER 55, @90ER 90, @90IR 30\par Strength: There is pain and discomfort with strength testing.\par Forward Flexion 4/5. Abduction 4/5.  External Rotation 5-/5 and Internal Rotation 5/5 \par Neurological testings: motor and sensor intact distally.\par Ligament Stability and Special Tests: \par There is positive arc of pain. \par Shoulder apprehension: neg\par Shoulder relocation: neg\par Gunter’s test: pos\par Biceps Active test: neg\par Johnson Labral Shear: neg\par Impingement testing: pos\par Alisha testing: pos\par Whipple: pos\par Cross Body Adduction: neg\par \par

## 2022-06-23 NOTE — HISTORY OF PRESENT ILLNESS
[de-identified] : 72 year old female  (RHD, retired) ramiro shoulders R>L chronic pain worsen 4/2022\par The pain is located  anterior,  lateral\par The pain is associated with weakness, cracking \par Worse with activity and better at rest.\par Has tried ice\par ***on xarelto cant take nsaids***

## 2022-07-06 NOTE — ED PROVIDER NOTE - CARE PLAN
Patient rated her pain 2/10. Discharge instructions were reviewed with patient and her . All questions were answered. 1 Principal Discharge DX:	Syncope

## 2022-11-09 ENCOUNTER — RX ONLY (RX ONLY)
Age: 72
End: 2022-11-09

## 2022-11-09 ENCOUNTER — OFFICE (OUTPATIENT)
Dept: URBAN - METROPOLITAN AREA CLINIC 35 | Facility: CLINIC | Age: 72
Setting detail: OPHTHALMOLOGY
End: 2022-11-09
Payer: COMMERCIAL

## 2022-11-09 DIAGNOSIS — H35.373: ICD-10-CM

## 2022-11-09 DIAGNOSIS — H25.13: ICD-10-CM

## 2022-11-09 PROBLEM — H52.03 HYPEROPIA; BOTH EYES: Status: ACTIVE | Noted: 2022-11-09

## 2022-11-09 PROCEDURE — 92134 CPTRZ OPH DX IMG PST SGM RTA: CPT | Performed by: OPHTHALMOLOGY

## 2022-11-09 PROCEDURE — 92014 COMPRE OPH EXAM EST PT 1/>: CPT | Performed by: OPHTHALMOLOGY

## 2022-11-09 ASSESSMENT — AXIALLENGTH_DERIVED
OS_AL: 22.8155
OS_AL: 22.77
OD_AL: 22.7274
OS_AL: 22.77
OD_AL: 22.8638
OD_AL: 23.0018
OD_AL: 22.8181
OD_AL: 22.8181
OS_AL: 22.7248
OS_AL: 22.8611

## 2022-11-09 ASSESSMENT — REFRACTION_MANIFEST
OS_VA1: 20/20
OS_AXIS: 100
OD_CYLINDER: -0.75
OD_AXIS: 095
OS_SPHERE: +1.25
OD_CYLINDER: +1.25
OS_CYLINDER: -0.75
OD_SPHERE: +0.75
OD_VA1: 20/40+
OS_VA1: 20/40+
OS_AXIS: 010
OD_VA1: 20/30-2
OS_CYLINDER: -0.75
OD_SPHERE: +0.50
OS_SPHERE: +1.50
OD_CYLINDER: -0.50
OS_CYLINDER: +1.00
OS_SPHERE: +0.75
OS_AXIS: 095
OD_AXIS: 070
OD_VA1: 20/40+3
OS_SPHERE: +1.50
OS_AXIS: 107
OS_CYLINDER: -1.00
OD_VA1: 20/25
OD_AXIS: 010
OD_SPHERE: +1.00
OD_CYLINDER: -0.75
OS_VA1: 20/40
OD_AXIS: 085
OD_SPHERE: +1.25

## 2022-11-09 ASSESSMENT — REFRACTION_AUTOREFRACTION
OS_AXIS: 104
OD_SPHERE: +1.25
OD_AXIS: 070
OD_CYLINDER: -0.75
OS_CYLINDER: -1.25
OS_SPHERE: +1.75

## 2022-11-09 ASSESSMENT — REFRACTION_CURRENTRX
OS_OVR_VA: 20/
OD_VPRISM_DIRECTION: SV
OD_AXIS: 095
OD_OVR_VA: 20/
OD_SPHERE: +2.75
OS_SPHERE: +2.50
OD_CYLINDER: -0.25
OS_AXIS: 090
OS_CYLINDER: -0.25
OS_VPRISM_DIRECTION: SV

## 2022-11-09 ASSESSMENT — SPHEQUIV_DERIVED
OS_SPHEQUIV: 0.875
OD_SPHEQUIV: 0.875
OS_SPHEQUIV: 1.25
OD_SPHEQUIV: 0.375
OD_SPHEQUIV: 0.75
OS_SPHEQUIV: 1
OS_SPHEQUIV: 1.125
OS_SPHEQUIV: 1.125
OD_SPHEQUIV: 1.125
OD_SPHEQUIV: 0.875

## 2022-11-09 ASSESSMENT — VISUAL ACUITY
OS_BCVA: 20/40-
OD_BCVA: 20/30-

## 2022-11-09 ASSESSMENT — KERATOMETRY
OS_K1POWER_DIOPTERS: 44.25
OD_K1POWER_DIOPTERS: 44.50
OS_K2POWER_DIOPTERS: 45.00
OD_AXISANGLE_DEGREES: 101
OS_AXISANGLE_DEGREES: 081
OD_K2POWER_DIOPTERS: 45.00

## 2022-11-09 ASSESSMENT — CONFRONTATIONAL VISUAL FIELD TEST (CVF)
OD_FINDINGS: FULL
OS_FINDINGS: FULL

## 2022-11-14 ENCOUNTER — NON-APPOINTMENT (OUTPATIENT)
Age: 72
End: 2022-11-14

## 2022-11-15 ENCOUNTER — APPOINTMENT (OUTPATIENT)
Dept: DERMATOLOGY | Facility: CLINIC | Age: 72
End: 2022-11-15

## 2022-11-15 VITALS — BODY MASS INDEX: 20.77 KG/M2 | WEIGHT: 121 LBS

## 2022-11-15 DIAGNOSIS — A49.8 OTHER BACTERIAL INFECTIONS OF UNSPECIFIED SITE: ICD-10-CM

## 2022-11-15 DIAGNOSIS — L60.1 ONYCHOLYSIS: ICD-10-CM

## 2022-11-15 PROCEDURE — 99204 OFFICE O/P NEW MOD 45 MIN: CPT

## 2022-11-15 RX ORDER — GENTAMICIN SULFATE 3 MG/ML
0.3 SOLUTION OPHTHALMIC
Qty: 1 | Refills: 2 | Status: ACTIVE | COMMUNITY
Start: 2022-11-15 | End: 1900-01-01

## 2022-11-15 NOTE — ASSESSMENT
[FreeTextEntry1] : 1. Pseudomonas nail infection, L 1st digit-\par - Education, counseling.\par - Pseudomonas nail infection consists of a triad of green discoloration of the nail plate, paronychia, and distolateral onycholysis. \par - Treatment options and expectations from treatment discussed.\par - START Gentamicin sulfate 0.3% opthalmic solution, apply one drop to nail including underneath the nail twice a day (morning and night) until nail resolves. SED. \par - Counseled Patient on daily vinegar soaks, advised 1 part white vinegar and 1 part warm water. \par - Advised Pt to start applying Vaseline around the nail throughout the day.\par - She knows to call or contact Dr. Gordon via Follow My Health for any questions or concerns. \par \par RTC in 3 months.

## 2022-11-15 NOTE — HISTORY OF PRESENT ILLNESS
[FreeTextEntry1] : Returning patient, spot on finger [de-identified] : Patient is a 72 yoF presenting today for evaluation of the following:\par LV: 12/2018 with Dr. Sykes\par \par 1. Discoloration at nail of L first digit- started in January 2022. Pt unsure if trauma occurred. \par - Used OTC antifungal cream for a couple of weeks and denies seeing any improvement. Nail became very black. \par - Several weeks ago, area started to throb and become very painful. \par - Pt's primary care referred here here for evaluation of fungus.\par \par FH: Hx of NMSC in father. \par No P hx of skin CA.

## 2022-11-15 NOTE — PHYSICAL EXAM
[FreeTextEntry3] : alert, oriented x 3, well nourished, conjunctiva non-injected, no visual lymphadenopathy, no clubbing, no edema, no bromhidrosis and no chromhidrosis.\par \par Focused physical exam performed today at request of Patient, significant for the following:\par \par - Yellow, black and green discoloration of nail at L first digit with erythematous scaly patches at proximal and lateral nailfold. \par - yellow discoloration at the distal fingernail.

## 2022-11-15 NOTE — HISTORY OF PRESENT ILLNESS
[FreeTextEntry1] : Returning patient, spot on finger [de-identified] : Patient is a 72 yoF presenting today for evaluation of the following:\par LV: 12/2018 with Dr. Sykes\par \par 1. Discoloration at nail of L first digit- started in January 2022. Pt unsure if trauma occurred. \par - Used OTC antifungal cream for a couple of weeks and denies seeing any improvement. Nail became very black. \par - Several weeks ago, area started to throb and become very painful. \par - Pt's primary care referred here here for evaluation of fungus.\par \par FH: Hx of NMSC in father. \par No P hx of skin CA.

## 2022-12-19 ENCOUNTER — INPATIENT (INPATIENT)
Facility: HOSPITAL | Age: 72
LOS: 2 days | Discharge: HOME | DRG: 179 | End: 2022-12-22
Attending: INTERNAL MEDICINE | Admitting: INTERNAL MEDICINE
Payer: COMMERCIAL

## 2022-12-19 VITALS
DIASTOLIC BLOOD PRESSURE: 114 MMHG | WEIGHT: 119.93 LBS | TEMPERATURE: 98 F | OXYGEN SATURATION: 98 % | HEART RATE: 67 BPM | RESPIRATION RATE: 18 BRPM | HEIGHT: 64 IN | SYSTOLIC BLOOD PRESSURE: 179 MMHG

## 2022-12-19 DIAGNOSIS — Z98.890 OTHER SPECIFIED POSTPROCEDURAL STATES: Chronic | ICD-10-CM

## 2022-12-19 DIAGNOSIS — Z98.891 HISTORY OF UTERINE SCAR FROM PREVIOUS SURGERY: Chronic | ICD-10-CM

## 2022-12-19 PROCEDURE — 99285 EMERGENCY DEPT VISIT HI MDM: CPT

## 2022-12-19 PROCEDURE — 93010 ELECTROCARDIOGRAM REPORT: CPT

## 2022-12-19 NOTE — ED ADULT TRIAGE NOTE - PRO INTERPRETER NEED 2
[address]: Ms Amanda Dang was seen today at 13w3d for nuchal translucency ultrasound  See ultrasound report under "OB Procedures" tab  Please don't hesitate to contact our office with any concerns or questions    Sejal Mendez MD English

## 2022-12-20 DIAGNOSIS — I21.4 NON-ST ELEVATION (NSTEMI) MYOCARDIAL INFARCTION: ICD-10-CM

## 2022-12-20 DIAGNOSIS — I10 ESSENTIAL (PRIMARY) HYPERTENSION: ICD-10-CM

## 2022-12-20 DIAGNOSIS — Z29.9 ENCOUNTER FOR PROPHYLACTIC MEASURES, UNSPECIFIED: ICD-10-CM

## 2022-12-20 DIAGNOSIS — I48.91 UNSPECIFIED ATRIAL FIBRILLATION: ICD-10-CM

## 2022-12-20 LAB
ALBUMIN SERPL ELPH-MCNC: 3.9 G/DL — SIGNIFICANT CHANGE UP (ref 3.3–5)
ALP SERPL-CCNC: 41 U/L — SIGNIFICANT CHANGE UP (ref 40–120)
ALT FLD-CCNC: 26 U/L — SIGNIFICANT CHANGE UP (ref 10–45)
ANION GAP SERPL CALC-SCNC: 11 MMOL/L — SIGNIFICANT CHANGE UP (ref 5–17)
APTT BLD: 32.4 SEC — SIGNIFICANT CHANGE UP (ref 27.5–35.5)
APTT BLD: 39.6 SEC — HIGH (ref 27.5–35.5)
APTT BLD: 41.5 SEC — HIGH (ref 27.5–35.5)
AST SERPL-CCNC: 40 U/L — SIGNIFICANT CHANGE UP (ref 10–40)
BASOPHILS # BLD AUTO: 0.02 K/UL — SIGNIFICANT CHANGE UP (ref 0–0.2)
BASOPHILS NFR BLD AUTO: 0.2 % — SIGNIFICANT CHANGE UP (ref 0–2)
BILIRUB SERPL-MCNC: 0.4 MG/DL — SIGNIFICANT CHANGE UP (ref 0.2–1.2)
BUN SERPL-MCNC: 25 MG/DL — HIGH (ref 7–23)
CALCIUM SERPL-MCNC: 9.3 MG/DL — SIGNIFICANT CHANGE UP (ref 8.4–10.5)
CHLORIDE SERPL-SCNC: 102 MMOL/L — SIGNIFICANT CHANGE UP (ref 96–108)
CK MB BLD-MCNC: 8.6 % — HIGH (ref 0–3.5)
CK MB CFR SERPL CALC: 14.4 NG/ML — HIGH (ref 0–3.8)
CK SERPL-CCNC: 167 U/L — SIGNIFICANT CHANGE UP (ref 25–170)
CO2 SERPL-SCNC: 26 MMOL/L — SIGNIFICANT CHANGE UP (ref 22–31)
CREAT SERPL-MCNC: 0.85 MG/DL — SIGNIFICANT CHANGE UP (ref 0.5–1.3)
D DIMER BLD IA.RAPID-MCNC: 167 NG/ML DDU — SIGNIFICANT CHANGE UP
EGFR: 73 ML/MIN/1.73M2 — SIGNIFICANT CHANGE UP
EOSINOPHIL # BLD AUTO: 0.1 K/UL — SIGNIFICANT CHANGE UP (ref 0–0.5)
EOSINOPHIL NFR BLD AUTO: 1.1 % — SIGNIFICANT CHANGE UP (ref 0–6)
FLUAV AG NPH QL: SIGNIFICANT CHANGE UP
FLUBV AG NPH QL: SIGNIFICANT CHANGE UP
GLUCOSE SERPL-MCNC: 104 MG/DL — HIGH (ref 70–99)
HCT VFR BLD CALC: 38.6 % — SIGNIFICANT CHANGE UP (ref 34.5–45)
HCT VFR BLD CALC: 38.9 % — SIGNIFICANT CHANGE UP (ref 34.5–45)
HGB BLD-MCNC: 12.7 G/DL — SIGNIFICANT CHANGE UP (ref 11.5–15.5)
HGB BLD-MCNC: 12.9 G/DL — SIGNIFICANT CHANGE UP (ref 11.5–15.5)
IMM GRANULOCYTES NFR BLD AUTO: 0.3 % — SIGNIFICANT CHANGE UP (ref 0–0.9)
INR BLD: 1.18 RATIO — HIGH (ref 0.88–1.16)
INR BLD: 1.18 RATIO — HIGH (ref 0.88–1.16)
LYMPHOCYTES # BLD AUTO: 1.77 K/UL — SIGNIFICANT CHANGE UP (ref 1–3.3)
LYMPHOCYTES # BLD AUTO: 19.9 % — SIGNIFICANT CHANGE UP (ref 13–44)
MCHC RBC-ENTMCNC: 28.3 PG — SIGNIFICANT CHANGE UP (ref 27–34)
MCHC RBC-ENTMCNC: 28.9 PG — SIGNIFICANT CHANGE UP (ref 27–34)
MCHC RBC-ENTMCNC: 32.6 GM/DL — SIGNIFICANT CHANGE UP (ref 32–36)
MCHC RBC-ENTMCNC: 33.4 GM/DL — SIGNIFICANT CHANGE UP (ref 32–36)
MCV RBC AUTO: 86.5 FL — SIGNIFICANT CHANGE UP (ref 80–100)
MCV RBC AUTO: 86.6 FL — SIGNIFICANT CHANGE UP (ref 80–100)
MONOCYTES # BLD AUTO: 0.78 K/UL — SIGNIFICANT CHANGE UP (ref 0–0.9)
MONOCYTES NFR BLD AUTO: 8.8 % — SIGNIFICANT CHANGE UP (ref 2–14)
NEUTROPHILS # BLD AUTO: 6.2 K/UL — SIGNIFICANT CHANGE UP (ref 1.8–7.4)
NEUTROPHILS NFR BLD AUTO: 69.7 % — SIGNIFICANT CHANGE UP (ref 43–77)
NRBC # BLD: 0 /100 WBCS — SIGNIFICANT CHANGE UP (ref 0–0)
NRBC # BLD: 0 /100 WBCS — SIGNIFICANT CHANGE UP (ref 0–0)
PLATELET # BLD AUTO: 257 K/UL — SIGNIFICANT CHANGE UP (ref 150–400)
PLATELET # BLD AUTO: 282 K/UL — SIGNIFICANT CHANGE UP (ref 150–400)
POTASSIUM SERPL-MCNC: 3.8 MMOL/L — SIGNIFICANT CHANGE UP (ref 3.5–5.3)
POTASSIUM SERPL-SCNC: 3.8 MMOL/L — SIGNIFICANT CHANGE UP (ref 3.5–5.3)
PROT SERPL-MCNC: 7 G/DL — SIGNIFICANT CHANGE UP (ref 6–8.3)
PROTHROM AB SERPL-ACNC: 13.6 SEC — HIGH (ref 10.5–13.4)
PROTHROM AB SERPL-ACNC: 13.7 SEC — HIGH (ref 10.5–13.4)
RBC # BLD: 4.46 M/UL — SIGNIFICANT CHANGE UP (ref 3.8–5.2)
RBC # BLD: 4.49 M/UL — SIGNIFICANT CHANGE UP (ref 3.8–5.2)
RBC # FLD: 13.1 % — SIGNIFICANT CHANGE UP (ref 10.3–14.5)
RBC # FLD: 13.1 % — SIGNIFICANT CHANGE UP (ref 10.3–14.5)
RSV RNA NPH QL NAA+NON-PROBE: SIGNIFICANT CHANGE UP
SARS-COV-2 RNA SPEC QL NAA+PROBE: DETECTED
SODIUM SERPL-SCNC: 139 MMOL/L — SIGNIFICANT CHANGE UP (ref 135–145)
TROPONIN T, HIGH SENSITIVITY RESULT: 417 NG/L — HIGH (ref 0–51)
TROPONIN T, HIGH SENSITIVITY RESULT: 420 NG/L — HIGH (ref 0–51)
WBC # BLD: 8.51 K/UL — SIGNIFICANT CHANGE UP (ref 3.8–10.5)
WBC # BLD: 8.9 K/UL — SIGNIFICANT CHANGE UP (ref 3.8–10.5)
WBC # FLD AUTO: 8.51 K/UL — SIGNIFICANT CHANGE UP (ref 3.8–10.5)
WBC # FLD AUTO: 8.9 K/UL — SIGNIFICANT CHANGE UP (ref 3.8–10.5)

## 2022-12-20 PROCEDURE — 71046 X-RAY EXAM CHEST 2 VIEWS: CPT | Mod: 26

## 2022-12-20 PROCEDURE — 93306 TTE W/DOPPLER COMPLETE: CPT | Mod: 26

## 2022-12-20 PROCEDURE — 99223 1ST HOSP IP/OBS HIGH 75: CPT

## 2022-12-20 RX ORDER — ASPIRIN/CALCIUM CARB/MAGNESIUM 324 MG
162 TABLET ORAL ONCE
Refills: 0 | Status: COMPLETED | OUTPATIENT
Start: 2022-12-20 | End: 2022-12-20

## 2022-12-20 RX ORDER — HEPARIN SODIUM 5000 [USP'U]/ML
INJECTION INTRAVENOUS; SUBCUTANEOUS
Qty: 25000 | Refills: 0 | Status: DISCONTINUED | OUTPATIENT
Start: 2022-12-20 | End: 2022-12-22

## 2022-12-20 RX ORDER — ONDANSETRON 8 MG/1
4 TABLET, FILM COATED ORAL EVERY 8 HOURS
Refills: 0 | Status: DISCONTINUED | OUTPATIENT
Start: 2022-12-20 | End: 2022-12-22

## 2022-12-20 RX ORDER — HEPARIN SODIUM 5000 [USP'U]/ML
3500 INJECTION INTRAVENOUS; SUBCUTANEOUS ONCE
Refills: 0 | Status: COMPLETED | OUTPATIENT
Start: 2022-12-20 | End: 2022-12-20

## 2022-12-20 RX ORDER — LANOLIN ALCOHOL/MO/W.PET/CERES
3 CREAM (GRAM) TOPICAL AT BEDTIME
Refills: 0 | Status: DISCONTINUED | OUTPATIENT
Start: 2022-12-20 | End: 2022-12-22

## 2022-12-20 RX ORDER — RIVAROXABAN 15 MG-20MG
1 KIT ORAL
Qty: 0 | Refills: 0 | DISCHARGE

## 2022-12-20 RX ORDER — LOSARTAN POTASSIUM 100 MG/1
100 TABLET, FILM COATED ORAL DAILY
Refills: 0 | Status: DISCONTINUED | OUTPATIENT
Start: 2022-12-20 | End: 2022-12-22

## 2022-12-20 RX ORDER — ATENOLOL 25 MG/1
1 TABLET ORAL
Qty: 0 | Refills: 0 | DISCHARGE

## 2022-12-20 RX ORDER — ATENOLOL 25 MG/1
50 TABLET ORAL DAILY
Refills: 0 | Status: DISCONTINUED | OUTPATIENT
Start: 2022-12-20 | End: 2022-12-22

## 2022-12-20 RX ORDER — ATORVASTATIN CALCIUM 80 MG/1
80 TABLET, FILM COATED ORAL AT BEDTIME
Refills: 0 | Status: DISCONTINUED | OUTPATIENT
Start: 2022-12-20 | End: 2022-12-22

## 2022-12-20 RX ORDER — ACETAMINOPHEN 500 MG
650 TABLET ORAL EVERY 6 HOURS
Refills: 0 | Status: DISCONTINUED | OUTPATIENT
Start: 2022-12-20 | End: 2022-12-22

## 2022-12-20 RX ORDER — AMLODIPINE BESYLATE AND OLMESARTRAN MEDOXOMIL 10; 40 MG/1; MG/1
1 TABLET, FILM COATED ORAL
Qty: 0 | Refills: 0 | DISCHARGE

## 2022-12-20 RX ORDER — OLMESARTAN MEDOXOMIL 5 MG/1
1 TABLET, FILM COATED ORAL
Qty: 0 | Refills: 0 | DISCHARGE

## 2022-12-20 RX ORDER — HEPARIN SODIUM 5000 [USP'U]/ML
3500 INJECTION INTRAVENOUS; SUBCUTANEOUS EVERY 6 HOURS
Refills: 0 | Status: DISCONTINUED | OUTPATIENT
Start: 2022-12-20 | End: 2022-12-22

## 2022-12-20 RX ORDER — ASPIRIN/CALCIUM CARB/MAGNESIUM 324 MG
81 TABLET ORAL DAILY
Refills: 0 | Status: DISCONTINUED | OUTPATIENT
Start: 2022-12-21 | End: 2022-12-22

## 2022-12-20 RX ADMIN — ATENOLOL 50 MILLIGRAM(S): 25 TABLET ORAL at 16:36

## 2022-12-20 RX ADMIN — HEPARIN SODIUM 3500 UNIT(S): 5000 INJECTION INTRAVENOUS; SUBCUTANEOUS at 07:15

## 2022-12-20 RX ADMIN — LOSARTAN POTASSIUM 100 MILLIGRAM(S): 100 TABLET, FILM COATED ORAL at 16:36

## 2022-12-20 RX ADMIN — Medication 162 MILLIGRAM(S): at 05:21

## 2022-12-20 RX ADMIN — HEPARIN SODIUM 800 UNIT(S)/HR: 5000 INJECTION INTRAVENOUS; SUBCUTANEOUS at 16:35

## 2022-12-20 RX ADMIN — HEPARIN SODIUM 3500 UNIT(S): 5000 INJECTION INTRAVENOUS; SUBCUTANEOUS at 16:36

## 2022-12-20 RX ADMIN — HEPARIN SODIUM 700 UNIT(S)/HR: 5000 INJECTION INTRAVENOUS; SUBCUTANEOUS at 07:17

## 2022-12-20 RX ADMIN — Medication 162 MILLIGRAM(S): at 06:31

## 2022-12-20 RX ADMIN — ATORVASTATIN CALCIUM 80 MILLIGRAM(S): 80 TABLET, FILM COATED ORAL at 22:09

## 2022-12-20 NOTE — H&P ADULT - NSHPPHYSICALEXAM_GEN_ALL_CORE
VITAL SIGNS:  T(C): 36.7 (12-20-22 @ 06:42), Max: 36.7 (12-19-22 @ 22:44)  T(F): 98.1 (12-20-22 @ 06:42), Max: 98.1 (12-20-22 @ 06:42)  HR: 58 (12-20-22 @ 06:42) (58 - 67)  BP: 170/96 (12-20-22 @ 06:42) (170/96 - 179/114)  RR: 18 (12-20-22 @ 06:42) (18 - 18)  SpO2: 96% (12-20-22 @ 06:42) (96% - 98%)    PHYSICAL EXAM:  Constitutional: WDWN resting comfortably in bed; NAD  Head: NC/AT  Eyes: PERRL, EOMI, anicteric sclera  ENT: no nasal discharge; uvula midline, no oropharyngeal erythema or exudates; MMM  Neck: supple; no JVD or thyromegaly  Respiratory: CTA B/L; no W/R/R, no retractions  Cardiac: +S1/S2; RRR; no M/R/G; PMI non-displaced  Gastrointestinal: abdomen soft, NT/ND; no rebound or guarding; +BSx4  Back: spine midline, no bony tenderness or step-offs; no CVAT B/L  Extremities: WWP, no clubbing or cyanosis; no peripheral edema  Musculoskeletal: NROM x4; no joint swelling, tenderness or erythema  Vascular: 2+ radial, femoral, DP/PT pulses B/L  Dermatologic: skin warm, dry and intact; no rashes, wounds, or scars  Lymphatic: no submandibular or cervical LAD  Neurologic: AAOx3; CNII-XII grossly intact; no focal deficits  Psychiatric: affect and characteristics of appearance, verbalizations, behaviors are appropriate

## 2022-12-20 NOTE — ED PROVIDER NOTE - ATTENDING CONTRIBUTION TO CARE
MD Etienne:  patient seen and evaluated personally.   I agree with the History & Physical,  Impression & Plan other than what was detailed in my note.  MD Etienne  72-year-old female history of hypertension, A. fib on Xarelto, no known history of CAD, presented emergency department with chief complaint of chest heaviness and left-sided arm discomfort that started after she came in from walking her dog.  Lasted several moments and is currently gone.  Denies any associated shortness of breath, nausea vomiting or diaphoresis.  No recent illnesses, no fevers or chills, no near syncope or palpitations. no hx of dvt/pe, no hemoptysis, no leg swelling, no recent travels, no hemoptysis, no produtcive sputum, afebrile vitals stable  non toxic well appearing, NC/AT,  conjunctiva non conjected, sclera anicteric, moist mucous membranes, neck supple, heart sounds, normal, no mrg, lungs cta b/l no wrr, abd soft non distended w/ no tenderness, no visual deformities of extremities, axox3, , normal mood and affect ekg shows sinus bradycardia 56 bpm, nad no sig st changes, will get cbc, cmp, trop, cxr, re evaluation. will rediscuss dispo if everything is normal.

## 2022-12-20 NOTE — ED PROVIDER NOTE - PHYSICAL EXAMINATION
Gen: WDWN, NAD, comfortable appearing, afebrile   HEENT: No nasal discharge, mucous membranes moist, no oropharyngeal edema/erythema/exudates   CV: Regular rate with irregular rhythm , +S1/S2, no M/R/G, equal b/l radial pulses 2+  Resp: CTAB, no W/R/R, SPO2 >95% on RA, no increased WOB   GI: Abdomen soft non-distended, NTTP, no masses/organomegaly   MSK/Skin: No CVA tenderness, no open wounds, no bruising, no LE edema/calf tenderness   Neuro: A&Ox4, moving all 4 extremities spontaneously, gross sensation intact in UE and LE BL  Psych: appropriate mood

## 2022-12-20 NOTE — CHART NOTE - NSCHARTNOTEFT_GEN_A_CORE
Medicine NP note     CC: 5 Beats of WCT  Tele reviewed, Sr at baseline  Pt asymptomatic    Vital Signs Last 24 Hrs  T(C): 37.8 (20 Dec 2022 21:44), Max: 38 (20 Dec 2022 21:04)  T(F): 100.1 (20 Dec 2022 21:44), Max: 100.4 (20 Dec 2022 21:04)  HR: 89 (20 Dec 2022 21:44) (57 - 89)  BP: 153/91 (20 Dec 2022 21:44) (139/51 - 170/96)  BP(mean): --  RR: 18 (20 Dec 2022 21:44) (18 - 18)  SpO2: 95% (20 Dec 2022 21:44) (95% - 98%)    Parameters below as of 20 Dec 2022 21:04  Patient On (Oxygen Delivery Method): room air    A/P    72F PMH atrial fibrillation hypertension and eczema presents to the hospital with chest pain and found to have an NSTEMI, now admitted for further evaluation and treatment.     ·  Problem: NSTEMI (non-ST elevation myocardial infarction).   ·  Plan: Patient presented with 1 hour of chest pain which radiated down her left arm. On admission, EKG with new T wave inversions however without ST segment changes suggestive of ischemia or infarction. Troponin T elevated to 400s. Patient received ASA load and started on heparin gtts. Cardiology aware and will see patient.   - Follow up cardiology recommendations   - For now will continue with heparin gtts  - Continue with home atenolol and will convert home olmesartan to losartan 100 mg PO Qd  - Trend troponin to peak   - Will start atorvastatin 80 mg PO Qd.     Problem/Plan - 2:  ·  Problem: Atrial fibrillation.   ·  Plan: - Continue with home atenolol  - AC with heparin gtts, will transition back to home Xarelto once ensured no procedural interventions are pending.    Patient had 5 beats of WCT on tele  Asymptomatic  C/W Tele Medicine NP note     CC: 5 Beats of WCT  Tele reviewed, Sr at baseline  Pt asymptomatic    Vital Signs Last 24 Hrs  T(C): 37.8 (20 Dec 2022 21:44), Max: 38 (20 Dec 2022 21:04)  T(F): 100.1 (20 Dec 2022 21:44), Max: 100.4 (20 Dec 2022 21:04)  HR: 89 (20 Dec 2022 21:44) (57 - 89)  BP: 153/91 (20 Dec 2022 21:44) (139/51 - 170/96)  BP(mean): --  RR: 18 (20 Dec 2022 21:44) (18 - 18)  SpO2: 95% (20 Dec 2022 21:44) (95% - 98%)    Parameters below as of 20 Dec 2022 21:04  Patient On (Oxygen Delivery Method): room air    A/P    72F PMH atrial fibrillation hypertension and eczema presents to the hospital with chest pain and found to have an NSTEMI, now admitted for further evaluation and treatment.     ·  Problem: NSTEMI (non-ST elevation myocardial infarction).   ·  Plan: Patient presented with 1 hour of chest pain which radiated down her left arm. On admission, EKG with new T wave inversions however without ST segment changes suggestive of ischemia or infarction. Troponin T elevated to 400s. Patient received ASA load and started on heparin gtts. Cardiology aware and will see patient.   - Follow up cardiology recommendations   - For now will continue with heparin gtts  - Continue with home atenolol and will convert home olmesartan to losartan 100 mg PO Qd  - Trend troponin to peak   - Will start atorvastatin 80 mg PO Qd.     Problem/Plan - 2:  ·  Problem: Atrial fibrillation.   ·  Plan: - Continue with home atenolol  - AC with heparin gtts, will transition back to home Xarelto once ensured no procedural interventions are pending.    Patient had 5 beats of WCT on tele  Asymptomatic  C/W Tele  C/W atenolol ? switvh to Metoprolol  F/U cardiology am  F/U AM electrolytes  Keep k+ > 4.0, mg> 2.0, phos: > 3.0  Will continue to monitor  Will sign out to day team    Allen Brunner [facility] [place]  [id number]

## 2022-12-20 NOTE — ED PROVIDER NOTE - PROGRESS NOTE DETAILS
Attending Jaimie:  repeat ekg added, getting weight for pt, called cardiology, requested we call private, dr solorzano. [phone], awaiting call back. Ricci, PGY-3  Spoke with [facility] who reports to start heparin drip with no other loading meds at this time, given full dose aspirin. Rpt trop sent. Admitted to Dr. Jones paged [facility] (dr hackett) [phone], awaiting call back. ekg with some new t wave inversion, no st elevation, pt remains asymptomatic

## 2022-12-20 NOTE — H&P ADULT - ASSESSMENT
72F PMH atrial fibrillation hypertension and eczema presents to the hospital with chest pain and found to have an NSTEMI, now admitted for further evaluation and treatment.

## 2022-12-20 NOTE — ED ADULT NURSE NOTE - OBJECTIVE STATEMENT
Pt presents to the ED A&OX4 co chest pain x yesterday. Pt presents to the ED A&OX4 co chest pain x last night approx 10 pm. hx afib on xarelto. Pt states that she was walking her dog last night when experienced sudden onset chest pain, constant r/t L arm and back. Pt endorses syymptoms subsiding now. Endorses chronic cough, with no other associated symptoms.  Denies NVD, fevers, chills, blurry vision and HAs.

## 2022-12-20 NOTE — ED PROVIDER NOTE - OBJECTIVE STATEMENT
71 y/o female with pmhx of afib on xarelto, HTN presenting with chest pain. Patient describes left-sided chest pain, radiating down the left upper extremity around 10 PM after walking dog with no associated LOC, palpitations, diaphoresis, focal weakness, numbness/tingling, leg swelling/pain, recent bedbound nature, surgery, travel, hemoptysis. Does report cough for the past month with no fever/chills, nausea/vomiting/diarrhea, rashes, or changes in urination.  Recent echocardiogram performed on 6 December with normal results.  Unsure of last stress test. No chest pain on presentation.

## 2022-12-20 NOTE — CONSULT NOTE ADULT - SUBJECTIVE AND OBJECTIVE BOX
C A R D I O L O G Y  *********************    DATE OF SERVICE: 22    HISTORY OF PRESENT ILLNESS: HPI:  Patient is a 71 y/o female well known to our office with PMH of paroxysmal atrial fibrillation on Xarelto and hypertension who presented with chest pain admitted with NSTEMI and found to be +COVID. Cardiology consulted for further evaluation. Patient reports experiencing left sided chest pain which radiated down her left arm on evening prior to admission which prompted her to come to the hospital. Patient denies loss of consciousness, palpitations or diaphoresis. Denies similar previous experiences. Patient reports that pain persisted for about 1 hour, and resolved after presenting to the emergency room. In ED, EKG obtained with new T wave inversions, however with no ST segment changes. Labs were drawn, and the patient was found to have an elevated troponin T. Heparin gtts was started, and patient is now admitted to medicine service for further evaluation and treatment. She denies active chest pain, SOB, palpitations, dizziness, or syncope.    PAST MEDICAL & SURGICAL HISTORY:  Atrial fibrillation  on xarelto      Hypertension      Eczema      Osteopenia      History of  section  x2      H/O umbilical hernia repair      S/P left knee arthroscopy      S/P right knee arthroscopy              MEDICATIONS:  MEDICATIONS  (STANDING):  atenolol  Tablet 50 milliGRAM(s) Oral daily  atorvastatin 80 milliGRAM(s) Oral at bedtime  heparin  Infusion.  Unit(s)/Hr (7 mL/Hr) IV Continuous <Continuous>  losartan 100 milliGRAM(s) Oral daily      Allergies    amidoamine (additive in soaps/shampoos) (Rash)  latex (Rash)  No Known Drug Allergies    Intolerances    erythromycin (Stomach Upset)      FAMILY HISTORY:  Family history of atrial fibrillation (Father, Mother)    Family history of stroke (Mother)    Family history of breast cancer (Sibling)    Hypertension (Sibling)      Non-contributary for premature coronary disease or sudden cardiac death    SOCIAL HISTORY:    [x ] Non-smoker  [ ] Smoker  [ ] Alcohol    FLU VACCINE THIS YEAR STARTS IN AUGUST:  [ ] Yes    [ ] No    IF OVER 65 HAVE YOU EVER HAD A PNA VACCINE:  [ ] Yes    [ ] No       [ ] N/A      REVIEW OF SYSTEMS:  [x ]chest pain  [  ]shortness of breath  [  ]palpitations  [  ]syncope  [ ]near syncope [ ]upper extremity weakness   [ ] lower extremity weakness  [  ]diplopia  [  ]altered mental status   [  ]fevers  [ ]chills [ ]nausea  [ ]vomiting  [  ]dysphagia    [ ]abdominal pain  [ ]melena  [ ]BRBPR    [  ]epistaxis  [  ]rash    [ ]lower extremity edema        [X] All others negative	  [ ] Unable to obtain      LABS:	 	    CARDIAC MARKERS:  CARDIAC MARKERS ( 20 Dec 2022 06:42 )  x     / x     / 167 U/L / x     / 14.4 ng/mL                              12.9   8.51  )-----------( 257      ( 20 Dec 2022 14:19 )             38.6     Hb Trend: 12.9<--, 12.7<--    12    139  |  102  |  25<H>  ----------------------------<  104<H>  3.8   |  26  |  0.85    Ca    9.3      20 Dec 2022 05:31    TPro  7.0  /  Alb  3.9  /  TBili  0.4  /  DBili  x   /  AST  40  /  ALT  26  /  AlkPhos  41      Creatinine Trend: 0.85<--    Coags:  PT/INR - ( 20 Dec 2022 15:47 )   PT: 13.6 sec;   INR: 1.18 ratio         PTT - ( 20 Dec 2022 15:47 )  PTT:39.6 sec    proBNP:   Lipid Profile:   HgA1c:   TSH:         PHYSICAL EXAM:  T(C): 36.9 (22 @ 14:32), Max: 36.9 (22 @ 14:32)  HR: 57 (22 @ 14:32) (57 - 67)  BP: 141/79 (22 @ 14:32) (141/79 - 179/114)  RR: 18 (22 @ 14:32) (18 - 18)  SpO2: 98% (22 @ 14:32) (96% - 98%)  Wt(kg): --   BMI (kg/m2): 21.9 (22 @ 06:32)  I&O's Summary      Gen: Appears well in NAD  HEENT:  (-)icterus (-)pallor  CV: N S1 S2 1/6 EDIE (+)2 Pulses B/l  Resp:  Clear to auscultation B/L, normal effort  GI: (+) BS Soft, NT, ND  Lymph:  (-)Edema, (-)obvious lymphadenopathy  Skin: Warm to touch, Normal turgor  Psych: Appropriate mood and affect      TELEMETRY: SR 60	      ECG: Sinus Bradycardia, narrow QRS, inferolateral TWI	    < from: Transthoracic Echocardiogram (22 @ 10:44) >  Conclusions:  1. Normal mitral valve. Minimal mitral regurgitation.  2. Normal left atrium.  LA volume index = 25 cc/m2.  3. Normal left ventricular internal dimensions and wall  thicknesses.  4. Normal left ventricular systolic function. No segmental  wall motion abnormalities. LVEF calculated using biplane  Alva's method is 70%.  5. Indeterminate diastolic function.  6. Normal right atrium.  7. Normal right ventricular size and function.  8. Normal tricuspid valve. Mild tricuspid regurgitation.  9. No pericardial effusion seen.  *** No previous Echo exam.    < end of copied text >      RADIOLOGY:         CXR: < from: Xray Chest 2 Views PA/Lat (22 @ 06:21) >  Impression:  Clear lungs.  Mild cardiomegaly    < end of copied text >      ASSESSMENT/PLAN: Patient is a 71 y/o female well known to our office with PMH of paroxysmal atrial fibrillation on Xarelto and hypertension who presented with chest pain admitted with NSTEMI and found to be +COVID. Cardiology consulted for further evaluation.     #NSTEMI  - Continue hep gtt  - s/p ASA load - start ASA 81mg daily tomorrow and continue Lipitor while workup pending  - Trend troponin until peak, negative CK noted  - D-dimer Negative  - TTE with normal LV function, no wall motion abnormalities  - Check CT Cors to r/o obstructive CAD    #COVID Infection  - Supportive care/treatment per med    #PAF  - Hold AC with Xarelto while workup pending  - Continue hep gtt for stroke prevention  - Rate control with Atenolol to prevent AF RVR    #HTN  - Continue Atenolol and Losartan    - Patient has f/u with Dr. De La Cruz on 1/10 at 12 PM    Alexis Rios PA-C  Pager: [phone]

## 2022-12-20 NOTE — H&P ADULT - PROBLEM SELECTOR PLAN 1
Patient presented with 1 hour of chest pain which radiated down her left arm. On admission, EKG with new T wave inversions however without ST segment changes suggestive of ischemia or infarction. Troponin T elevated to 400s. Patient received ASA load and started on heparin gtts. Cardiology aware and will see patient.   - Follow up cardiology recommendations   - For now will continue with heparin gtts  - Continue with home atenolol and will convert home olmesartan to losartan 100 mg PO Qd  - Trend troponin to peak   - Will start atorvastatin 80 mg PO Qd

## 2022-12-20 NOTE — ED ADULT NURSE REASSESSMENT NOTE - NS ED NURSE REASSESS COMMENT FT1
Pt is A&O4, on bedrest,, pt is being transferred to [facility] and report given to Juan Pablo, pts current condition, medical history and reason for admission reviewed, Right FA 20G And left AC IV sites are clean/dry/intact, Heparin running at 700 units, pt on cardiac monitor, pt is not in any pain or distress at this time, pt is aware of the transfer, the need for the transfer and acclimated to the new unit, provided the call button, personal items within reach, bed is in lowest position, wheels are locked, pt VS recorded and placed in the EMR, pt education deemed successful after teach back shows proficiency.

## 2022-12-20 NOTE — H&P ADULT - HISTORY OF PRESENT ILLNESS
72F PMH atrial fibrillation and hypertension presents to the hospital with chest pain. As per patient, she began experiencing left sided chest pain which radiated down her left arm on evening prior to admission which prompted her to come to the hospital. Patient denies loss of consciousness, palpitations or diaphoresis. Denies similar previous experiences. Patient reports that pain persisted for about 1 hour, and resolved after presenting to the emergency room. In ED, EKG obtained with new T wave inversions, however with no ST segment changes. Labs were drawn, and the patient was found to have an elevated troponin T. Heparin gtts was started, and patient is now admitted to medicine service for further evaluation and treatment.

## 2022-12-20 NOTE — H&P ADULT - PROBLEM SELECTOR PLAN 2
- Continue with home atenolol  - AC with heparin gtts, will transition back to home Xarelto once ensured no procedural interventions are pending

## 2022-12-20 NOTE — CONSULT NOTE ADULT - ASSESSMENT
Agree with above assessment and plan as outlined above.    - atypical symptoms   - elevated trop of questionable clinical significance   - Check CTA coronaries     Devaughn Jones MD, [facility]  BEEPER [phone]

## 2022-12-20 NOTE — ED PROVIDER NOTE - CLINICAL SUMMARY MEDICAL DECISION MAKING FREE TEXT BOX
73 y/o female with pmhx of afib on xarelto, HTN presenting with chest pain after walking, asymptomatic on presentation, hypertensive on triage (pending rpt vitals), well appearing, no PE risk factors, EKG non-ischemic. Given exertional nature of chest pain c/f ACS: trop, aspirin. CXR, basic labs, and reassess for possible CDU for stress test

## 2022-12-21 LAB
A1C WITH ESTIMATED AVERAGE GLUCOSE RESULT: 5.8 % — HIGH (ref 4–5.6)
ANION GAP SERPL CALC-SCNC: 11 MMOL/L — SIGNIFICANT CHANGE UP (ref 5–17)
APTT BLD: 60.1 SEC — HIGH (ref 27.5–35.5)
APTT BLD: 66.4 SEC — HIGH (ref 27.5–35.5)
BUN SERPL-MCNC: 17 MG/DL — SIGNIFICANT CHANGE UP (ref 7–23)
CALCIUM SERPL-MCNC: 9.1 MG/DL — SIGNIFICANT CHANGE UP (ref 8.4–10.5)
CHLORIDE SERPL-SCNC: 103 MMOL/L — SIGNIFICANT CHANGE UP (ref 96–108)
CHOLEST SERPL-MCNC: 182 MG/DL — SIGNIFICANT CHANGE UP
CK MB BLD-MCNC: 7.4 % — HIGH (ref 0–3.5)
CK MB CFR SERPL CALC: 11.4 NG/ML — HIGH (ref 0–3.8)
CK SERPL-CCNC: 154 U/L — SIGNIFICANT CHANGE UP (ref 25–170)
CO2 SERPL-SCNC: 28 MMOL/L — SIGNIFICANT CHANGE UP (ref 22–31)
CREAT SERPL-MCNC: 0.85 MG/DL — SIGNIFICANT CHANGE UP (ref 0.5–1.3)
EGFR: 73 ML/MIN/1.73M2 — SIGNIFICANT CHANGE UP
ESTIMATED AVERAGE GLUCOSE: 120 MG/DL — HIGH (ref 68–114)
GLUCOSE SERPL-MCNC: 101 MG/DL — HIGH (ref 70–99)
HCT VFR BLD CALC: 41.3 % — SIGNIFICANT CHANGE UP (ref 34.5–45)
HCV AB S/CO SERPL IA: 0.21 S/CO — SIGNIFICANT CHANGE UP (ref 0–0.99)
HCV AB SERPL-IMP: SIGNIFICANT CHANGE UP
HDLC SERPL-MCNC: 68 MG/DL — SIGNIFICANT CHANGE UP
HGB BLD-MCNC: 13.9 G/DL — SIGNIFICANT CHANGE UP (ref 11.5–15.5)
INR BLD: 1.14 RATIO — SIGNIFICANT CHANGE UP (ref 0.88–1.16)
LIPID PNL WITH DIRECT LDL SERPL: 101 MG/DL — HIGH
MCHC RBC-ENTMCNC: 28.5 PG — SIGNIFICANT CHANGE UP (ref 27–34)
MCHC RBC-ENTMCNC: 33.7 GM/DL — SIGNIFICANT CHANGE UP (ref 32–36)
MCV RBC AUTO: 84.8 FL — SIGNIFICANT CHANGE UP (ref 80–100)
NON HDL CHOLESTEROL: 114 MG/DL — SIGNIFICANT CHANGE UP
NRBC # BLD: 0 /100 WBCS — SIGNIFICANT CHANGE UP (ref 0–0)
PLATELET # BLD AUTO: 254 K/UL — SIGNIFICANT CHANGE UP (ref 150–400)
POTASSIUM SERPL-MCNC: 3.5 MMOL/L — SIGNIFICANT CHANGE UP (ref 3.5–5.3)
POTASSIUM SERPL-SCNC: 3.5 MMOL/L — SIGNIFICANT CHANGE UP (ref 3.5–5.3)
PROTHROM AB SERPL-ACNC: 13.1 SEC — SIGNIFICANT CHANGE UP (ref 10.5–13.4)
RBC # BLD: 4.87 M/UL — SIGNIFICANT CHANGE UP (ref 3.8–5.2)
RBC # FLD: 13.2 % — SIGNIFICANT CHANGE UP (ref 10.3–14.5)
SODIUM SERPL-SCNC: 142 MMOL/L — SIGNIFICANT CHANGE UP (ref 135–145)
TRIGL SERPL-MCNC: 64 MG/DL — SIGNIFICANT CHANGE UP
TROPONIN T, HIGH SENSITIVITY RESULT: 340 NG/L — HIGH (ref 0–51)
TSH SERPL-MCNC: 1.38 UIU/ML — SIGNIFICANT CHANGE UP (ref 0.27–4.2)
WBC # BLD: 8.07 K/UL — SIGNIFICANT CHANGE UP (ref 3.8–10.5)
WBC # FLD AUTO: 8.07 K/UL — SIGNIFICANT CHANGE UP (ref 3.8–10.5)

## 2022-12-21 PROCEDURE — 75574 CT ANGIO HRT W/3D IMAGE: CPT | Mod: 26

## 2022-12-21 RX ORDER — CHLORHEXIDINE GLUCONATE 213 G/1000ML
1 SOLUTION TOPICAL
Refills: 0 | Status: DISCONTINUED | OUTPATIENT
Start: 2022-12-21 | End: 2022-12-22

## 2022-12-21 RX ADMIN — HEPARIN SODIUM 900 UNIT(S)/HR: 5000 INJECTION INTRAVENOUS; SUBCUTANEOUS at 00:27

## 2022-12-21 RX ADMIN — Medication 650 MILLIGRAM(S): at 00:57

## 2022-12-21 RX ADMIN — LOSARTAN POTASSIUM 100 MILLIGRAM(S): 100 TABLET, FILM COATED ORAL at 06:36

## 2022-12-21 RX ADMIN — HEPARIN SODIUM 900 UNIT(S)/HR: 5000 INJECTION INTRAVENOUS; SUBCUTANEOUS at 07:49

## 2022-12-21 RX ADMIN — Medication 650 MILLIGRAM(S): at 00:27

## 2022-12-21 RX ADMIN — HEPARIN SODIUM 900 UNIT(S)/HR: 5000 INJECTION INTRAVENOUS; SUBCUTANEOUS at 14:20

## 2022-12-21 RX ADMIN — ATENOLOL 50 MILLIGRAM(S): 25 TABLET ORAL at 21:24

## 2022-12-21 RX ADMIN — HEPARIN SODIUM 900 UNIT(S)/HR: 5000 INJECTION INTRAVENOUS; SUBCUTANEOUS at 20:04

## 2022-12-21 RX ADMIN — HEPARIN SODIUM 900 UNIT(S)/HR: 5000 INJECTION INTRAVENOUS; SUBCUTANEOUS at 13:02

## 2022-12-21 RX ADMIN — ATORVASTATIN CALCIUM 80 MILLIGRAM(S): 80 TABLET, FILM COATED ORAL at 21:24

## 2022-12-21 RX ADMIN — CHLORHEXIDINE GLUCONATE 1 APPLICATION(S): 213 SOLUTION TOPICAL at 13:02

## 2022-12-21 RX ADMIN — Medication 81 MILLIGRAM(S): at 13:49

## 2022-12-21 RX ADMIN — HEPARIN SODIUM 900 UNIT(S)/HR: 5000 INJECTION INTRAVENOUS; SUBCUTANEOUS at 07:19

## 2022-12-21 NOTE — PATIENT PROFILE ADULT - FALL HARM RISK - HARM RISK INTERVENTIONS
Assistance with ambulation/Assistance OOB with selected safe patient handling equipment/Communicate Risk of Fall with Harm to all staff/Reinforce activity limits and safety measures with patient and family/Reorient to person, place and time as needed/Review medications for side effects contributing to fall risk/Sit up slowly, dangle for a short time, stand at bedside before walking/Tailored Fall Risk Interventions/Visual Cue: Yellow wristband and red socks/Bed in lowest position, wheels locked, appropriate side rails in place/Call bell, personal items and telephone in reach/Instruct patient to call for assistance before getting out of bed or chair/Non-slip footwear when patient is out of bed/[place] to call system/Physically safe environment - no spills, clutter or unnecessary equipment/Purposeful Proactive Rounding/Room/bathroom lighting operational, light cord in reach

## 2022-12-21 NOTE — PROVIDER CONTACT NOTE (OTHER) - ASSESSMENT
Pt is alert & oriented X4. Pt is on room air. Pt has no complaints of SOB/palpitations/chest pain. VSS. Pt is alert & oriented X4. Pt is on room air. Pt has no complaints of SOB/palpitations/chest pain. VSS. Pt is asymptomatic.

## 2022-12-22 ENCOUNTER — TRANSCRIPTION ENCOUNTER (OUTPATIENT)
Age: 72
End: 2022-12-22

## 2022-12-22 VITALS
SYSTOLIC BLOOD PRESSURE: 138 MMHG | HEART RATE: 70 BPM | DIASTOLIC BLOOD PRESSURE: 78 MMHG | OXYGEN SATURATION: 98 % | RESPIRATION RATE: 18 BRPM | TEMPERATURE: 98 F

## 2022-12-22 LAB
APTT BLD: 56.9 SEC — HIGH (ref 27.5–35.5)
HCT VFR BLD CALC: 37 % — SIGNIFICANT CHANGE UP (ref 34.5–45)
HGB BLD-MCNC: 12.4 G/DL — SIGNIFICANT CHANGE UP (ref 11.5–15.5)
MCHC RBC-ENTMCNC: 28.4 PG — SIGNIFICANT CHANGE UP (ref 27–34)
MCHC RBC-ENTMCNC: 33.5 GM/DL — SIGNIFICANT CHANGE UP (ref 32–36)
MCV RBC AUTO: 84.9 FL — SIGNIFICANT CHANGE UP (ref 80–100)
MRSA PCR RESULT.: SIGNIFICANT CHANGE UP
NRBC # BLD: 0 /100 WBCS — SIGNIFICANT CHANGE UP (ref 0–0)
PLATELET # BLD AUTO: 253 K/UL — SIGNIFICANT CHANGE UP (ref 150–400)
RBC # BLD: 4.36 M/UL — SIGNIFICANT CHANGE UP (ref 3.8–5.2)
RBC # FLD: 13.2 % — SIGNIFICANT CHANGE UP (ref 10.3–14.5)
S AUREUS DNA NOSE QL NAA+PROBE: DETECTED
WBC # BLD: 8.41 K/UL — SIGNIFICANT CHANGE UP (ref 3.8–10.5)
WBC # FLD AUTO: 8.41 K/UL — SIGNIFICANT CHANGE UP (ref 3.8–10.5)

## 2022-12-22 RX ORDER — LANOLIN ALCOHOL/MO/W.PET/CERES
1 CREAM (GRAM) TOPICAL
Qty: 0 | Refills: 0 | DISCHARGE
Start: 2022-12-22

## 2022-12-22 RX ORDER — ATORVASTATIN CALCIUM 80 MG/1
20 TABLET, FILM COATED ORAL AT BEDTIME
Refills: 0 | Status: DISCONTINUED | OUTPATIENT
Start: 2022-12-22 | End: 2022-12-22

## 2022-12-22 RX ORDER — ATORVASTATIN CALCIUM 80 MG/1
1 TABLET, FILM COATED ORAL
Qty: 30 | Refills: 0
Start: 2022-12-22 | End: 2023-01-20

## 2022-12-22 RX ORDER — ACETAMINOPHEN 500 MG
2 TABLET ORAL
Qty: 0 | Refills: 0 | DISCHARGE
Start: 2022-12-22

## 2022-12-22 RX ORDER — RIVAROXABAN 15 MG-20MG
20 KIT ORAL
Refills: 0 | Status: DISCONTINUED | OUTPATIENT
Start: 2022-12-22 | End: 2022-12-22

## 2022-12-22 RX ADMIN — HEPARIN SODIUM 900 UNIT(S)/HR: 5000 INJECTION INTRAVENOUS; SUBCUTANEOUS at 07:19

## 2022-12-22 RX ADMIN — HEPARIN SODIUM 900 UNIT(S)/HR: 5000 INJECTION INTRAVENOUS; SUBCUTANEOUS at 07:02

## 2022-12-22 RX ADMIN — LOSARTAN POTASSIUM 100 MILLIGRAM(S): 100 TABLET, FILM COATED ORAL at 05:22

## 2022-12-22 RX ADMIN — CHLORHEXIDINE GLUCONATE 1 APPLICATION(S): 213 SOLUTION TOPICAL at 05:22

## 2022-12-22 RX ADMIN — RIVAROXABAN 20 MILLIGRAM(S): KIT at 11:33

## 2022-12-22 NOTE — DISCHARGE NOTE PROVIDER - NSDCMRMEDTOKEN_GEN_ALL_CORE_FT
acetaminophen 325 mg oral tablet: 2 tab(s) orally every 6 hours, As needed, Temp greater or equal to 38C (100.4F), Mild Pain (1 - 3)  aluminum hydroxide-magnesium hydroxide 200 mg-200 mg/5 mL oral suspension: 30 milliliter(s) orally every 4 hours, As needed, Dyspepsia  atenolol 50 mg oral tablet: 1 tab(s) orally once a day  atorvastatin 20 mg oral tablet: 1 tab(s) orally once a day (at bedtime)  melatonin 3 mg oral tablet: 1 tab(s) orally once a day (at bedtime), As needed, Insomnia  olmesartan 40 mg oral tablet: 1 tab(s) orally once a day  rivaroxaban 20 mg oral tablet: 1 tab(s) orally once a day (in the evening)

## 2022-12-22 NOTE — DISCHARGE NOTE PROVIDER - HOSPITAL COURSE
73 y/o F well known to our office with PMH of paroxysmal atrial fibrillation on Xarelto and hypertension who presented with chest pain admitted with elevated troponin and found to be +COVID. Cardiology consulted for further evaluation.     #Elevated Troponin  - In setting of COVID infection, unclear clinical significance given CT Cors with minimal nonobstructive CAD  - Troponin now downtrended, negative CK noted  - D-dimer Negative  - TTE with normal LV function, no wall motion abnormalities  - Decrease Lipitor to 20mg at bedtime for minimal nonobstructive CAD for goal LDL closer to 70 (currently 101), no need for ASA as patient is already on AC with Xarelto    #COVID Infection  - Supportive care/treatment    #PAF  - Transition hep gtt to Xarelto for stroke prevention  - Rate control with Atenolol to prevent AF RVR  - Keep K>4, Mg>2    #HTN  - Continue Atenolol and Losartan (can resume Olmesartan at home)    Medically cleared for discharge.

## 2022-12-22 NOTE — DISCHARGE NOTE NURSING/CASE MANAGEMENT/SOCIAL WORK - NSDCPEFALRISK_GEN_ALL_CORE
For information on Fall & Injury Prevention, visit: [email or web address] OR  [email or web address] OR  https://www.cdc.gov/steadi/patient.html

## 2022-12-22 NOTE — DISCHARGE NOTE NURSING/CASE MANAGEMENT/SOCIAL WORK - PATIENT PORTAL LINK FT
You can access the FollowMyHealth Patient Portal offered by [facility] by registering at the following website: [email or web address]. By joining [facility]’s FollowMyHealth portal, you will also be able to view your health information using other applications (apps) compatible with our system.

## 2022-12-22 NOTE — DISCHARGE NOTE NURSING/CASE MANAGEMENT/SOCIAL WORK - NSPROMEDSDISPOSITION_GEN_A_NUR
This patient has been assessed with a concern for Malnutrition and was treated during this hospitalization for the following Nutrition diagnosis/diagnoses:     -  09/06/2021: Moderate protein-calorie malnutrition   bedside

## 2022-12-22 NOTE — DISCHARGE NOTE PROVIDER - PROVIDER TOKENS
PROVIDER:[TOKEN:[[id number]],SCHEDULEDAPPT:[01/10/2023],SCHEDULEDAPPTTIME:[12:00 PM],ESTABLISHEDPATIENT:[T]],PROVIDER:[TOKEN:[[id number]],FOLLOWUP:[2 weeks],ESTABLISHEDPATIENT:[T]]

## 2022-12-22 NOTE — DISCHARGE NOTE PROVIDER - NSDCCPCAREPLAN_GEN_ALL_CORE_FT
PRINCIPAL DISCHARGE DIAGNOSIS  Diagnosis: Angina pectoris  Assessment and Plan of Treatment: - In setting of COVID infection, unclear clinical significance given CT Cors with minimal nonobstructive CAD  - Echocardiogram of your heart showed normal LV function, no wall motion abnormalities  - Decrease Lipitor to 20mg at bedtime for minimal nonobstructive CAD for goal LDL closer to 70 (currently 101),  -Continue with your Xarelto for anticoagulation  -Follow up with Dr. De La Cruz on 1/10/23 at 12 PM      SECONDARY DISCHARGE DIAGNOSES  Diagnosis: 2019 novel coronavirus disease (COVID-19)  Assessment and Plan of Treatment: While admitted in the hospital you were found to be COVID positive and asymptomatic.   -Continue with supportive treatment at home  -Follow up with your primary care provider in 2 weeks    Diagnosis: Atrial fibrillation  Assessment and Plan of Treatment: Your atrial fibrillation was controlled on your home medications.   -Continue with your home Atenolol  -Continue with your home Xarelto  -Follow up with Dr. De La Cruz on 01/10/23 at 12pm.   -Follow up with your primary care provider in 2 weeks.    Diagnosis: HTN (hypertension)  Assessment and Plan of Treatment: -Can resume your at home medications  -Follow up with your primary care provider in 2 weeks.

## 2022-12-22 NOTE — DISCHARGE NOTE PROVIDER - CARE PROVIDER_API CALL
Lesly Auguste)  Cardiovascular Disease; Internal Medicine  [address], [address]  [place] [address]  Phone: [phone]  Fax: [phone]  Established Patient  Scheduled Appointment: 01/10/2023 12:00 PM    Marilin Whitlock)  Internal Medicine  [address] [place] [address]  Phone: [phone]  Fax: [phone]  Established Patient  Follow Up Time: 2 weeks

## 2022-12-22 NOTE — PROGRESS NOTE ADULT - ASSESSMENT
72F PMH atrial fibrillation hypertension and eczema presents to the hospital with chest pain and found to have an NSTEMI, now admitted for further evaluation and treatment.     Problem/Plan - 1:  ·  Problem: NSTEMI (non-ST elevation myocardial infarction).   ·  Plan: Patient presented with 1 hour of chest pain which radiated down her left arm. On admission, EKG with new T wave inversions however without ST segment changes suggestive of ischemia or infarction. Troponin T elevated to 400s. Patient received ASA load and started on heparin gtts. Cardiology aware and will see patient.   - Follow up cardiology recommendations   - For now will continue with heparin gtts  - Continue with home atenolol and will convert home olmesartan to losartan 100 mg PO Qd  - Trend troponin to peak   - Will start atorvastatin 80 mg PO Qd.    Problem/Plan - 2:  ·  Problem: Atrial fibrillation.   ·  Plan: - Continue with home atenolol  - AC with heparin gtts, will transition back to home Xarelto once ensured no procedural interventions are pending.    Problem/Plan - 3:  ·  Problem: Hypertension.   ·  Plan: - Continue with home atenolol and will convert home olmesartan to losartan 100 mg PO Qd.    Problem/Plan - 4:  ·  Problem: Need for prophylactic measure.   ·  Plan: DVT ppx - Heparin gtts.  
72F PMH atrial fibrillation hypertension and eczema presents to the hospital with chest pain and found to have an NSTEMI, now admitted for further evaluation and treatment.     Problem/Plan - 1:  ·  Problem: NSTEMI (non-ST elevation myocardial infarction).   ·  Plan: telemonitor   - Follow up cardiology recommendations   - dc heparin gtts  - cw statin  - ischemia rose as per cards     Problem/Plan - 2:  ·  Problem: Atrial fibrillation.   ·  Plan: - Continue with home atenolol  - cw AC    Problem/Plan - 3:  ·  Problem: Hypertension.   ·  Plan: - Continue with home atenolol and will convert home olmesartan to losartan 100 mg PO Qd.    Problem/Plan - 4:  ·  Problem: Need for prophylactic measure.   ·  Plan: DVT ppx - Heparin gtts.      
Agree with above assessment and plan as outlined above.    - start statin goal LDL<70  - D/C plan for today    Devaughn Jones MD, [facility]  BEEPER [phone]

## 2022-12-22 NOTE — PROGRESS NOTE ADULT - SUBJECTIVE AND OBJECTIVE BOX
Patient is a 72y old  Female who presents with a chief complaint of NSTEMI (22 Dec 2022 10:48)    Date of servie : 12-22-22 @ 13:06  INTERVAL HPI/OVERNIGHT EVENTS:  T(C): 37 (12-22-22 @ 04:00), Max: 37 (12-22-22 @ 04:00)  HR: 53 (12-22-22 @ 04:00) (53 - 68)  BP: 136/86 (12-22-22 @ 04:00) (128/68 - 136/86)  RR: 18 (12-22-22 @ 04:00) (18 - 18)  SpO2: 97% (12-22-22 @ 04:00) (97% - 97%)  Wt(kg): --  I&O's Summary    21 Dec 2022 07:01  -  22 Dec 2022 07:00  --------------------------------------------------------  IN: 840 mL / OUT: 0 mL / NET: 840 mL    22 Dec 2022 07:01  -  22 Dec 2022 13:06  --------------------------------------------------------  IN: 480 mL / OUT: 0 mL / NET: 480 mL        LABS:                        12.4   8.41  )-----------( 253      ( 22 Dec 2022 06:26 )             37.0     12-21    142  |  103  |  17  ----------------------------<  101<H>  3.5   |  28  |  0.85    Ca    9.1      21 Dec 2022 06:31      PT/INR - ( 21 Dec 2022 06:31 )   PT: 13.1 sec;   INR: 1.14 ratio         PTT - ( 22 Dec 2022 06:26 )  PTT:56.9 sec    CAPILLARY BLOOD GLUCOSE                MEDICATIONS  (STANDING):  atenolol  Tablet 50 milliGRAM(s) Oral daily  atorvastatin 20 milliGRAM(s) Oral at bedtime  chlorhexidine 2% Cloths 1 Application(s) Topical <User Schedule>  losartan 100 milliGRAM(s) Oral daily  rivaroxaban 20 milliGRAM(s) Oral with dinner    MEDICATIONS  (PRN):  acetaminophen     Tablet .. 650 milliGRAM(s) Oral every 6 hours PRN Temp greater or equal to 38C (100.4F), Mild Pain (1 - 3)  aluminum hydroxide/magnesium hydroxide/simethicone Suspension 30 milliLiter(s) Oral every 4 hours PRN Dyspepsia  hydrocodone/homatropine Syrup 5 milliLiter(s) Oral every 6 hours PRN Cough  melatonin 3 milliGRAM(s) Oral at bedtime PRN Insomnia  ondansetron Injectable 4 milliGRAM(s) IV Push every 8 hours PRN Nausea and/or Vomiting          PHYSICAL EXAM:  GENERAL: NAD, well-groomed, well-developed  HEAD:  Atraumatic, Normocephalic  CHEST/LUNG: Clear to percussion bilaterally; No rales, rhonchi, wheezing, or rubs  HEART: Regular rate and rhythm; No murmurs, rubs, or gallops  ABDOMEN: Soft, Nontender, Nondistended; Bowel sounds present  EXTREMITIES:  2+ Peripheral Pulses, No clubbing, cyanosis, or edema  LYMPH: No lymphadenopathy noted  SKIN: No rashes or lesions    Care Discussed with Consultants/Other Providers [ ] YES  [ ] NO
C A R D I O L O G Y  **********************************     DATE OF SERVICE: 12-21-22    Patient still has cough. denies further chest pain or shortness of breath.   Review of systems otherwise negative.  	  MEDICATIONS:  MEDICATIONS  (STANDING):  aspirin enteric coated 81 milliGRAM(s) Oral daily  atenolol  Tablet 50 milliGRAM(s) Oral daily  atorvastatin 80 milliGRAM(s) Oral at bedtime  chlorhexidine 2% Cloths 1 Application(s) Topical <User Schedule>  heparin  Infusion.  Unit(s)/Hr (7 mL/Hr) IV Continuous <Continuous>  losartan 100 milliGRAM(s) Oral daily      LABS:	 	    CARDIAC MARKERS:  CARDIAC MARKERS ( 21 Dec 2022 06:31 )  x     / x     / 154 U/L / x     / 11.4 ng/mL  CARDIAC MARKERS ( 20 Dec 2022 06:42 )  x     / x     / 167 U/L / x     / 14.4 ng/mL                                13.9   8.07  )-----------( 254      ( 21 Dec 2022 06:31 )             41.3     Hemoglobin: 13.9 g/dL (12-21 @ 06:31)  Hemoglobin: 12.9 g/dL (12-20 @ 14:19)  Hemoglobin: 12.7 g/dL (12-20 @ 05:31)      12-21    142  |  103  |  17  ----------------------------<  101<H>  3.5   |  28  |  0.85    Ca    9.1      21 Dec 2022 06:31    TPro  7.0  /  Alb  3.9  /  TBili  0.4  /  DBili  x   /  AST  40  /  ALT  26  /  AlkPhos  41  12-20    Creatinine Trend: 0.85<--, 0.85<--    COAGS:   PT/INR - ( 21 Dec 2022 06:31 )   PT: 13.1 sec;   INR: 1.14 ratio         PTT - ( 21 Dec 2022 13:42 )  PTT:66.4 sec    proBNP:   Lipid Profile:   HgA1c:   TSH: Thyroid Stimulating Hormone, Serum: 1.38 uIU/mL (12-21 @ 06:31)        PHYSICAL EXAM:  T(C): 36.8 (12-21-22 @ 11:46), Max: 38 (12-20-22 @ 21:04)  HR: 56 (12-21-22 @ 11:46) (56 - 89)  BP: 126/82 (12-21-22 @ 11:46) (126/82 - 153/91)  RR: 18 (12-21-22 @ 11:46) (18 - 18)  SpO2: 95% (12-21-22 @ 11:46) (95% - 98%)  Wt(kg): --  I&O's Summary    21 Dec 2022 07:01  -  21 Dec 2022 14:15  --------------------------------------------------------  IN: 600 mL / OUT: 0 mL / NET: 600 mL      Gen: Appears well in NAD  HEENT:  (-)icterus (-)pallor  CV: N S1 S2 1/6 EDIE (+)2 Pulses B/l  Resp:  Clear to auscultation B/L, normal effort  GI: (+) BS Soft, NT, ND  Lymph:  (-)Edema, (-)obvious lymphadenopathy  Skin: Warm to touch, Normal turgor  Psych: Appropriate mood and affect      TELEMETRY: SB/SR 45-60s, 5 beats NSVT	      ECG: Sinus Bradycardia, narrow QRS, inferolateral TWI	    < from: Transthoracic Echocardiogram (12.20.22 @ 10:44) >  Conclusions:  1. Normal mitral valve. Minimal mitral regurgitation.  2. Normal left atrium.  LA volume index = 25 cc/m2.  3. Normal left ventricular internal dimensions and wall  thicknesses.  4. Normal left ventricular systolic function. No segmental  wall motion abnormalities. LVEF calculated using biplane  Alva's method is 70%.  5. Indeterminate diastolic function.  6. Normal right atrium.  7. Normal right ventricular size and function.  8. Normal tricuspid valve. Mild tricuspid regurgitation.  9. No pericardial effusion seen.  *** No previous Echo exam.    < end of copied text >      ASSESSMENT/PLAN: Patient is a 71 y/o female well known to our office with PMH of paroxysmal atrial fibrillation on Xarelto and hypertension who presented with chest pain admitted with NSTEMI and found to be +COVID. Cardiology consulted for further evaluation.     #NSTEMI  - Continue hep gtt  - Continue ASA 81mg daily and Lipitor while workup pending  - Troponin now downtrended, negative CK noted  - D-dimer Negative  - TTE with normal LV function, no wall motion abnormalities  - Check CT Cors to r/o obstructive CAD    #COVID Infection  - Supportive care/treatment per med    #PAF  - Hold AC with Xarelto while workup pending  - Continue hep gtt for stroke prevention  - Rate control with Atenolol to prevent AF RVR  - Keep K>4, Mg>2    #HTN  - Continue Atenolol and Losartan    - Patient has f/u with Dr. De La Cruz on 1/10 at 12 PM    Alexis Rios PA-C  Pager: [phone]      
C A R D I O L O G Y  **********************************     DATE OF SERVICE: 12-22-22    Patient has cough but denies chest pain or shortness of breath on room air.   Review of symptoms otherwise negative.    MEDICATIONS:  acetaminophen     Tablet .. 650 milliGRAM(s) Oral every 6 hours PRN  aluminum hydroxide/magnesium hydroxide/simethicone Suspension 30 milliLiter(s) Oral every 4 hours PRN  atenolol  Tablet 50 milliGRAM(s) Oral daily  atorvastatin 20 milliGRAM(s) Oral at bedtime  chlorhexidine 2% Cloths 1 Application(s) Topical <User Schedule>  hydrocodone/homatropine Syrup 5 milliLiter(s) Oral every 6 hours PRN  losartan 100 milliGRAM(s) Oral daily  melatonin 3 milliGRAM(s) Oral at bedtime PRN  ondansetron Injectable 4 milliGRAM(s) IV Push every 8 hours PRN  rivaroxaban 20 milliGRAM(s) Oral with dinner      LABS:                        12.4   8.41  )-----------( 253      ( 22 Dec 2022 06:26 )             37.0       Hemoglobin: 12.4 g/dL (12-22 @ 06:26)  Hemoglobin: 13.9 g/dL (12-21 @ 06:31)  Hemoglobin: 12.9 g/dL (12-20 @ 14:19)  Hemoglobin: 12.7 g/dL (12-20 @ 05:31)      12-21    142  |  103  |  17  ----------------------------<  101<H>  3.5   |  28  |  0.85    Ca    9.1      21 Dec 2022 06:31      Creatinine Trend: 0.85<--, 0.85<--    COAGS: PTT - ( 22 Dec 2022 06:26 )  PTT:56.9 sec    CARDIAC MARKERS ( 21 Dec 2022 06:31 )  x     / x     / 154 U/L / x     / 11.4 ng/mL  CARDIAC MARKERS ( 20 Dec 2022 06:42 )  x     / x     / 167 U/L / x     / 14.4 ng/mL        PHYSICAL EXAM:  T(C): 37 (12-22-22 @ 04:00), Max: 37 (12-22-22 @ 04:00)  HR: 53 (12-22-22 @ 04:00) (53 - 68)  BP: 136/86 (12-22-22 @ 04:00) (126/82 - 136/86)  RR: 18 (12-22-22 @ 04:00) (18 - 18)  SpO2: 97% (12-22-22 @ 04:00) (95% - 97%)  Wt(kg): --    I&O's Summary    21 Dec 2022 07:01  -  22 Dec 2022 07:00  --------------------------------------------------------  IN: 840 mL / OUT: 0 mL / NET: 840 mL    22 Dec 2022 07:01  -  22 Dec 2022 10:48  --------------------------------------------------------  IN: 240 mL / OUT: 0 mL / NET: 240 mL          Gen: Appears well in NAD  HEENT:  (-)icterus (-)pallor  CV: N S1 S2 1/6 EDIE (+)2 Pulses B/l  Resp:  Clear to auscultation B/L, normal effort  GI: (+) BS Soft, NT, ND  Lymph:  (-)Edema, (-)obvious lymphadenopathy  Skin: Warm to touch, Normal turgor  Psych: Appropriate mood and affect      TELEMETRY: SB/SR 50-60s    ECG: Sinus Bradycardia, narrow QRS, inferolateral TWI	    < from: Transthoracic Echocardiogram (12.20.22 @ 10:44) >  Conclusions:  1. Normal mitral valve. Minimal mitral regurgitation.  2. Normal left atrium.  LA volume index = 25 cc/m2.  3. Normal left ventricular internal dimensions and wall  thicknesses.  4. Normal left ventricular systolic function. No segmental  wall motion abnormalities. LVEF calculated using biplane  Alva's method is 70%.  5. Indeterminate diastolic function.  6. Normal right atrium.  7. Normal right ventricular size and function.  8. Normal tricuspid valve. Mild tricuspid regurgitation.  9. No pericardial effusion seen.  *** No previous Echo exam.    < end of copied text >      < from: CT Angio Heart and Coronaries w/ IV Cont (12.21.22 @ 16:06) >  FINDINGS:    Cardiovascular:    Coronary arteries:  Coronary artery calcium Agatston score:  Left main (LM) coronary artery:  0  Left anterior descending (LAD) coronary artery:  0  Left circumflex (LCX) coronary artery:  0  Right coronary artery (RCA):  0  Total:  0    Right-dominant coronary circulation.    The LM coronary artery has minimal noncalcified plaque.    The LAD coronary artery has minimal (<30%) noncalcified plaque in the   proximal segment and the visualized mid segment. The small, tortuous mid   to distal vessel is not well visualized.The visualized proximal segment   of the tortuous diagonal branch has minimal (<30%) noncalcified plaque.    The LCX coronary artery has minimal (<30%) noncalcified plaque in the   proximal segment. The small distal segment is not well visualized. The   small, first obtuse marginal (OM) branch is not well visualized. The   large, tortuous second OM branch has minimal (<30%) noncalcified plaque.    The RCA is not well visualized in all segments. Probably, there is no   significant luminal stenosisin the RCA.  The small right posterior   descending artery (PDA) is not well visualized.    Aorta:  No thoracic aortic dissection noted in the visualized thoracic aorta.    Mild noncalcified and calcified plaque is present in the imaged aorta.    Pericardium:  There is no pericardial effusion.    Chambers:  The cardiac chambers are qualitatively normal in size.    Myocardial crypts are present in the basal to mid inferoseptal segment of   the left ventricle.    No filling defect noted in the leftatrial appendage.    Mitral valve:  Leaflets appear mildly thickened.    Non-cardiac:  Emphysema. Nodular opacities within right middle lobe and lingula   possibly infectious in etiology. Mildly enlarged right right paratracheal   lymph node measuring 1.1 cm in short axis.    IMPRESSION:  Limited coronary computed tomography angiography due to suboptimal image   quality.    Nonobstructive epicardial coronary artery disease in the visualized   coronary artery segments as reported above.    No coronary artery calcium (Agatston score 0).    Nodular opacities within right middle lobe and lingula possibly   infectious in etiology. Mildly enlarged right lower paratracheal lymph   node measuring 1.1 cm in short axis. Recommend follow-up chest CT in 4   weeks to determine resolution.    Emphysema. Recommend annual lung cancer screening with low-dose chest CT   if the patient meets the criteria.    --- End of Report ---    < end of copied text >      ASSESSMENT/PLAN: Patient is a 73 y/o female well known to our office with PMH of paroxysmal atrial fibrillation on Xarelto and hypertension who presented with chest pain admitted with elevated troponin and found to be +COVID. Cardiology consulted for further evaluation.     #Elevated Troponin  - In setting of COVID infection, unclear clinical significance given CT Cors with minimal nonobstructive CAD  - D/c hep gtt as no evidence of ACS/NSTEMI as originally suspected  - Troponin now downtrended, negative CK noted  - D-dimer Negative  - TTE with normal LV function, no wall motion abnormalities  - Decrease Lipitor to 20mg at bedtime for minimal nonobstructive CAD for goal LDL closer to 70 (currently 101), no need for ASA as patient is already on AC with Xarelto    #COVID Infection  - Supportive care/treatment per med    #PAF  - Transition hep gtt to Xarelto for stroke prevention  - Rate control with Atenolol to prevent AF RVR  - Keep K>4, Mg>2    #HTN  - Continue Atenolol and Losartan (can resume Olmesartan at home)    - No further inpatient cardiac w/u planned - stable for discharge from CV perspective  - Patient has f/u with Dr. De La Cruz on 1/10 at 12 PM    Alexis Rios PA-C  Pager: [phone]      
Patient is a 72y old  Female who presents with a chief complaint of NSTEMI (20 Dec 2022 17:16)    Date of servie : 12-21-22 @ 13:52  INTERVAL HPI/OVERNIGHT EVENTS:  T(C): 36.8 (12-21-22 @ 11:46), Max: 38 (12-20-22 @ 21:04)  HR: 56 (12-21-22 @ 11:46) (56 - 89)  BP: 126/82 (12-21-22 @ 11:46) (126/82 - 153/91)  RR: 18 (12-21-22 @ 11:46) (18 - 18)  SpO2: 95% (12-21-22 @ 11:46) (95% - 98%)  Wt(kg): --  I&O's Summary    21 Dec 2022 07:01  -  21 Dec 2022 13:52  --------------------------------------------------------  IN: 600 mL / OUT: 0 mL / NET: 600 mL        LABS:                        13.9   8.07  )-----------( 254      ( 21 Dec 2022 06:31 )             41.3     12-21    142  |  103  |  17  ----------------------------<  101<H>  3.5   |  28  |  0.85    Ca    9.1      21 Dec 2022 06:31    TPro  7.0  /  Alb  3.9  /  TBili  0.4  /  DBili  x   /  AST  40  /  ALT  26  /  AlkPhos  41  12-20    PT/INR - ( 21 Dec 2022 06:31 )   PT: 13.1 sec;   INR: 1.14 ratio         PTT - ( 21 Dec 2022 06:31 )  PTT:60.1 sec    CAPILLARY BLOOD GLUCOSE                MEDICATIONS  (STANDING):  aspirin enteric coated 81 milliGRAM(s) Oral daily  atenolol  Tablet 50 milliGRAM(s) Oral daily  atorvastatin 80 milliGRAM(s) Oral at bedtime  chlorhexidine 2% Cloths 1 Application(s) Topical <User Schedule>  heparin  Infusion.  Unit(s)/Hr (7 mL/Hr) IV Continuous <Continuous>  losartan 100 milliGRAM(s) Oral daily    MEDICATIONS  (PRN):  acetaminophen     Tablet .. 650 milliGRAM(s) Oral every 6 hours PRN Temp greater or equal to 38C (100.4F), Mild Pain (1 - 3)  aluminum hydroxide/magnesium hydroxide/simethicone Suspension 30 milliLiter(s) Oral every 4 hours PRN Dyspepsia  heparin   Injectable 3500 Unit(s) IV Push every 6 hours PRN For aPTT less than 40  hydrocodone/homatropine Syrup 5 milliLiter(s) Oral every 6 hours PRN Cough  melatonin 3 milliGRAM(s) Oral at bedtime PRN Insomnia  ondansetron Injectable 4 milliGRAM(s) IV Push every 8 hours PRN Nausea and/or Vomiting          PHYSICAL EXAM:  GENERAL: NAD, well-groomed, well-developed  HEAD:  Atraumatic, Normocephalic  CHEST/LUNG: Clear to percussion bilaterally; No rales, rhonchi, wheezing, or rubs  HEART: Regular rate and rhythm; No murmurs, rubs, or gallops  ABDOMEN: Soft, Nontender, Nondistended; Bowel sounds present  EXTREMITIES:  2+ Peripheral Pulses, No clubbing, cyanosis, or edema  LYMPH: No lymphadenopathy noted  SKIN: No rashes or lesions    Care Discussed with Consultants/Other Providers [ ] YES  [ ] NO

## 2023-01-04 PROCEDURE — 85027 COMPLETE CBC AUTOMATED: CPT

## 2023-01-04 PROCEDURE — 99285 EMERGENCY DEPT VISIT HI MDM: CPT | Mod: 25

## 2023-01-04 PROCEDURE — 36415 COLL VENOUS BLD VENIPUNCTURE: CPT

## 2023-01-04 PROCEDURE — 85610 PROTHROMBIN TIME: CPT

## 2023-01-04 PROCEDURE — 80048 BASIC METABOLIC PNL TOTAL CA: CPT

## 2023-01-04 PROCEDURE — 71046 X-RAY EXAM CHEST 2 VIEWS: CPT

## 2023-01-04 PROCEDURE — 75574 CT ANGIO HRT W/3D IMAGE: CPT

## 2023-01-04 PROCEDURE — 85025 COMPLETE CBC W/AUTO DIFF WBC: CPT

## 2023-01-04 PROCEDURE — 82553 CREATINE MB FRACTION: CPT

## 2023-01-04 PROCEDURE — 87640 STAPH A DNA AMP PROBE: CPT

## 2023-01-04 PROCEDURE — 86803 HEPATITIS C AB TEST: CPT

## 2023-01-04 PROCEDURE — 87641 MR-STAPH DNA AMP PROBE: CPT

## 2023-01-04 PROCEDURE — 82550 ASSAY OF CK (CPK): CPT

## 2023-01-04 PROCEDURE — 80061 LIPID PANEL: CPT

## 2023-01-04 PROCEDURE — 85730 THROMBOPLASTIN TIME PARTIAL: CPT

## 2023-01-04 PROCEDURE — 84484 ASSAY OF TROPONIN QUANT: CPT

## 2023-01-04 PROCEDURE — 93306 TTE W/DOPPLER COMPLETE: CPT

## 2023-01-04 PROCEDURE — 80053 COMPREHEN METABOLIC PANEL: CPT

## 2023-01-04 PROCEDURE — 87637 SARSCOV2&INF A&B&RSV AMP PRB: CPT

## 2023-01-04 PROCEDURE — 83036 HEMOGLOBIN GLYCOSYLATED A1C: CPT

## 2023-01-04 PROCEDURE — 84443 ASSAY THYROID STIM HORMONE: CPT

## 2023-01-04 PROCEDURE — 85379 FIBRIN DEGRADATION QUANT: CPT

## 2023-01-10 ENCOUNTER — OFFICE (OUTPATIENT)
Dept: URBAN - METROPOLITAN AREA CLINIC 35 | Facility: CLINIC | Age: 73
Setting detail: OPHTHALMOLOGY
End: 2023-01-10
Payer: COMMERCIAL

## 2023-01-10 DIAGNOSIS — H35.62: ICD-10-CM

## 2023-01-10 PROCEDURE — 99213 OFFICE O/P EST LOW 20 MIN: CPT | Performed by: OPHTHALMOLOGY

## 2023-01-10 ASSESSMENT — REFRACTION_MANIFEST
OS_VA1: 20/20
OD_VA1: 20/40+3
OD_VA1: 20/25
OS_SPHERE: +1.50
OS_AXIS: 100
OS_CYLINDER: -1.25
OD_AXIS: 010
OD_CYLINDER: -0.75
OS_AXIS: 095
OS_CYLINDER: -0.75
OD_SPHERE: +0.50
OD_CYLINDER: -0.50
OD_SPHERE: +0.75
OD_CYLINDER: -0.50
OS_CYLINDER: -1.00
OS_SPHERE: +1.25
OD_VA1: 20/30-2
OS_AXIS: 100
OD_SPHERE: +1.25
OS_VA1: 20/40+3
OS_AXIS: 010
OD_AXIS: 015
OS_CYLINDER: -0.75
OS_SPHERE: +1.50
OD_VA1: 20/30-2
OD_SPHERE: +1.50
OD_CYLINDER: -0.75
OD_AXIS: 070
OD_AXIS: 095
OS_VA1: 20/40+
OS_CYLINDER: +1.00
OD_AXIS: 085
OS_VA1: 20/40
OS_AXIS: 107
OD_CYLINDER: +1.25
OD_SPHERE: +1.00
OS_SPHERE: +1.75
OD_VA1: 20/40+
OS_SPHERE: +0.75

## 2023-01-10 ASSESSMENT — SPHEQUIV_DERIVED
OD_SPHEQUIV: 0.75
OD_SPHEQUIV: 0.375
OS_SPHEQUIV: 1.125
OS_SPHEQUIV: 0.875
OD_SPHEQUIV: 0.875
OS_SPHEQUIV: 1.375
OD_SPHEQUIV: 0.125
OD_SPHEQUIV: 1.125
OS_SPHEQUIV: 1.125
OS_SPHEQUIV: 1
OS_SPHEQUIV: 1.25
OD_SPHEQUIV: 1.25

## 2023-01-10 ASSESSMENT — AXIALLENGTH_DERIVED
OS_AL: 22.8181
OD_AL: 22.8181
OD_AL: 23.0018
OS_AL: 22.7274
OD_AL: 22.6822
OS_AL: 22.7726
OD_AL: 23.0948
OS_AL: 22.6373
OD_AL: 22.8638
OS_AL: 22.6822
OD_AL: 22.7274
OS_AL: 22.7274

## 2023-01-10 ASSESSMENT — REFRACTION_AUTOREFRACTION
OD_CYLINDER: -2.25
OS_AXIS: 100
OD_AXIS: 053
OS_CYLINDER: -1.25
OS_SPHERE: +2.00
OD_SPHERE: +1.25

## 2023-01-10 ASSESSMENT — KERATOMETRY
METHOD_AUTO_MANUAL: AUTO
OD_K2POWER_DIOPTERS: 45.00
OS_AXISANGLE_DEGREES: 077
OS_K1POWER_DIOPTERS: 44.50
OD_K1POWER_DIOPTERS: 44.50
OS_K2POWER_DIOPTERS: 45.00
OD_AXISANGLE_DEGREES: 102

## 2023-01-10 ASSESSMENT — CONFRONTATIONAL VISUAL FIELD TEST (CVF)
OD_FINDINGS: FULL
OS_FINDINGS: FULL

## 2023-01-10 ASSESSMENT — REFRACTION_CURRENTRX
OS_VPRISM_DIRECTION: SV
OS_SPHERE: +2.50
OS_OVR_VA: 20/
OS_CYLINDER: -0.25
OS_AXIS: 090
OD_VPRISM_DIRECTION: SV
OD_SPHERE: +2.75
OD_AXIS: 095
OD_OVR_VA: 20/
OD_CYLINDER: -0.25

## 2023-01-10 ASSESSMENT — VISUAL ACUITY
OD_BCVA: 20/40-2
OS_BCVA: 20/50-1

## 2023-01-10 ASSESSMENT — TONOMETRY
OS_IOP_MMHG: 15
OD_IOP_MMHG: 15

## 2023-01-24 ENCOUNTER — OFFICE (OUTPATIENT)
Dept: URBAN - METROPOLITAN AREA CLINIC 35 | Facility: CLINIC | Age: 73
Setting detail: OPHTHALMOLOGY
End: 2023-01-24
Payer: COMMERCIAL

## 2023-01-24 DIAGNOSIS — H25.13: ICD-10-CM

## 2023-01-24 DIAGNOSIS — H35.62: ICD-10-CM

## 2023-01-24 DIAGNOSIS — H35.3111: ICD-10-CM

## 2023-01-24 PROBLEM — H35.412 LATTICE DEGENERATION; LEFT EYE: Status: ACTIVE | Noted: 2023-01-10

## 2023-01-24 PROCEDURE — 92012 INTRM OPH EXAM EST PATIENT: CPT | Performed by: OPHTHALMOLOGY

## 2023-01-24 ASSESSMENT — REFRACTION_MANIFEST
OS_CYLINDER: -0.75
OS_CYLINDER: -1.25
OD_VA1: 20/30
OS_AXIS: 010
OS_VA1: 20/20
OD_SPHERE: +1.25
OD_AXIS: 085
OD_SPHERE: +1.00
OD_SPHERE: +0.50
OS_VA1: 20/40+3
OD_SPHERE: +1.50
OS_SPHERE: +1.75
OD_AXIS: 070
OD_CYLINDER: -0.50
OD_VA1: 20/25
OS_VA1: 20/40+
OD_VA1: 20/30-2
OS_AXIS: 100
OS_AXIS: 107
OS_AXIS: 100
OS_CYLINDER: -0.75
OS_SPHERE: +1.50
OD_AXIS: 055
OD_CYLINDER: +1.25
OD_CYLINDER: -0.50
OS_CYLINDER: +1.00
OD_CYLINDER: -0.50
OD_CYLINDER: -0.75
OS_CYLINDER: -0.75
OD_VA1: 20/40+
OD_VA1: 20/40+3
OD_SPHERE: +0.75
OD_AXIS: 010
OD_VA1: 20/30-2
OS_SPHERE: +1.50
OD_SPHERE: +1.25
OD_AXIS: 095
OS_VA1: 20/40
OS_AXIS: 100
OS_SPHERE: +0.75
OD_AXIS: 015
OD_CYLINDER: -0.75
OS_AXIS: 095
OS_CYLINDER: -1.00
OS_SPHERE: +1.50
OS_SPHERE: +1.25

## 2023-01-24 ASSESSMENT — SPHEQUIV_DERIVED
OD_SPHEQUIV: 0.75
OS_SPHEQUIV: 1.125
OD_SPHEQUIV: 1
OS_SPHEQUIV: 1.125
OD_SPHEQUIV: 1.25
OD_SPHEQUIV: 0.875
OD_SPHEQUIV: 0.375
OS_SPHEQUIV: 0.875
OD_SPHEQUIV: 0.125
OS_SPHEQUIV: 1
OD_SPHEQUIV: 1.125
OS_SPHEQUIV: 1.375
OS_SPHEQUIV: 1.25
OS_SPHEQUIV: 1.125

## 2023-01-24 ASSESSMENT — REFRACTION_AUTOREFRACTION
OD_AXIS: 053
OS_AXIS: 100
OD_SPHERE: +1.25
OS_SPHERE: +2.00
OS_CYLINDER: -1.25
OD_CYLINDER: -2.25

## 2023-01-24 ASSESSMENT — AXIALLENGTH_DERIVED
OD_AL: 22.7726
OD_AL: 23.0948
OD_AL: 22.7274
OD_AL: 22.6822
OS_AL: 22.7274
OS_AL: 22.8181
OD_AL: 22.8181
OS_AL: 22.7274
OS_AL: 22.7726
OS_AL: 22.6373
OS_AL: 22.7274
OS_AL: 22.6822
OD_AL: 23.0018
OD_AL: 22.8638

## 2023-01-24 ASSESSMENT — KERATOMETRY
OS_AXISANGLE_DEGREES: 077
OD_K2POWER_DIOPTERS: 45.00
OS_K2POWER_DIOPTERS: 45.00
OS_K1POWER_DIOPTERS: 44.50
OD_K1POWER_DIOPTERS: 44.50
METHOD_AUTO_MANUAL: AUTO
OD_AXISANGLE_DEGREES: 102

## 2023-01-24 ASSESSMENT — REFRACTION_CURRENTRX
OD_VPRISM_DIRECTION: SV
OS_OVR_VA: 20/
OD_SPHERE: +2.75
OS_AXIS: 090
OD_AXIS: 095
OS_CYLINDER: -0.25
OS_SPHERE: +2.50
OD_CYLINDER: -0.25
OD_OVR_VA: 20/
OS_VPRISM_DIRECTION: SV

## 2023-01-24 ASSESSMENT — VISUAL ACUITY
OD_BCVA: 20/20-1
OS_BCVA: 20/50-1

## 2023-01-24 ASSESSMENT — TONOMETRY
OD_IOP_MMHG: 15
OS_IOP_MMHG: 15

## 2023-02-14 ENCOUNTER — APPOINTMENT (OUTPATIENT)
Dept: DERMATOLOGY | Facility: CLINIC | Age: 73
End: 2023-02-14
Payer: MEDICARE

## 2023-02-14 DIAGNOSIS — L03.012 CELLULITIS OF LEFT FINGER: ICD-10-CM

## 2023-02-14 PROCEDURE — 99213 OFFICE O/P EST LOW 20 MIN: CPT

## 2023-03-08 NOTE — ASU PATIENT PROFILE, ADULT - VISION (WITH CORRECTIVE LENSES IF THE PATIENT USUALLY WEARS THEM):
Viral gastroenteritis (viral stomach/intestinal infection with vomiting and diarrhea)    -Giving smaller, more frequent amounts of liquid can help reduce how much your child vomits. If they're vomiting after a full bottle/cup, try giving them a small amount (like an ounce or even down to a teaspoon), wait at least ten minutes, then try giving another ounce.   -Some children with gastroenteritis have less vomiting if they drink water or pedialyte instead of milk/formula.  -Sometimes giving your child yogurt can act as a natural probiotic and help the diarrhea last less long, but it only works some of the time.    When to bring your child back or get in touch with us:  -If vomiting isn't getting significantly better after 2-3 days  -If the diarrhea lasts for more than 2 weeks  -Making fewer and fewer wet diapers  -Becoming more and more tired  -If there is blood or black in the stool  -If there is green or red in the vomit  -If they have 5 or more days of fever  -or other concerns     reading glasses/Normal vision: sees adequately in most situations; can see medication labels, newsprint

## 2023-04-13 ENCOUNTER — APPOINTMENT (OUTPATIENT)
Dept: ORTHOPEDIC SURGERY | Facility: CLINIC | Age: 73
End: 2023-04-13
Payer: MEDICARE

## 2023-04-13 VITALS — WEIGHT: 120 LBS | BODY MASS INDEX: 20.49 KG/M2 | HEIGHT: 64 IN

## 2023-04-13 DIAGNOSIS — M79.18 MYALGIA, OTHER SITE: ICD-10-CM

## 2023-04-13 PROCEDURE — 20610 DRAIN/INJ JOINT/BURSA W/O US: CPT | Mod: 50

## 2023-04-13 PROCEDURE — 73564 X-RAY EXAM KNEE 4 OR MORE: CPT | Mod: 50

## 2023-04-13 PROCEDURE — 99214 OFFICE O/P EST MOD 30 MIN: CPT | Mod: 25

## 2023-04-13 PROCEDURE — J3490M: CUSTOM

## 2023-04-13 NOTE — IMAGING

## 2023-04-13 NOTE — HISTORY OF PRESENT ILLNESS
[de-identified] : 73 year old female  (RHD, retired )  chronic ramiro knees L>R pain worsening  since 4/10/23 \par The pain is located  anterior, lateral , medial\par The pain is associated with  swelling, clicking\par Worse with activity and better at rest.\par Has tried ice, Tylenol , csi and visco in past\par H/O bl meniscus surgeries 2008, 2010\par **afib on Xarelto***

## 2023-04-18 RX ORDER — HYALURONATE SODIUM 20 MG/2 ML
20 SYRINGE (ML) INTRAARTICULAR
Qty: 6 | Refills: 0 | Status: ACTIVE | COMMUNITY
Start: 2023-04-18 | End: 1900-01-01

## 2023-06-29 ENCOUNTER — APPOINTMENT (OUTPATIENT)
Dept: ORTHOPEDIC SURGERY | Facility: CLINIC | Age: 73
End: 2023-06-29
Payer: MEDICARE

## 2023-06-29 PROCEDURE — 20610 DRAIN/INJ JOINT/BURSA W/O US: CPT | Mod: 50

## 2023-06-29 PROCEDURE — 99214 OFFICE O/P EST MOD 30 MIN: CPT | Mod: 25

## 2023-06-29 NOTE — ASSESSMENT
[FreeTextEntry1] : \par - We discussed their diagnosis and treatment options at length including the risks and benefits of both surgical treatment with a knee replacement and non-surgical options.\par - We will continue conservative treatment with activity modification, PT, icing, weight loss, and anti-inflammatory medications.\par - The patient was provided with a PT prescription to work on ROM, hip ER/abductors strengthening, quad/hamstring stretches and strengthening, and other exercises \par - The patient was advised to let pain guide the gradual advancement of activities. \par - We also discussed the possibility of viscosupplementation injections and she would like to proceed\par - b/l knee euflexxa #1 - cami well\par \par

## 2023-06-29 NOTE — IMAGING

## 2023-06-29 NOTE — HISTORY OF PRESENT ILLNESS
[de-identified] : 73 year old female  (RHD, retired )  chronic ramiro knees L>R pain worsening  since 4/10/23 \par The pain is located  anterior, lateral , medial\par The pain is associated with  swelling, clicking\par Worse with activity and better at rest.\par Has tried ice, Tylenol , csi and visco in past\par H/O bl meniscus surgeries 2008, 2010\par **afib on Xarelto***\par \par 6/29/23 - here with continued b/l knee pain, b/l CSI (4/13), would like to discuss possible visco injections

## 2023-07-06 ENCOUNTER — APPOINTMENT (OUTPATIENT)
Dept: ORTHOPEDIC SURGERY | Facility: CLINIC | Age: 73
End: 2023-07-06

## 2023-07-06 ENCOUNTER — APPOINTMENT (OUTPATIENT)
Dept: ORTHOPEDIC SURGERY | Facility: CLINIC | Age: 73
End: 2023-07-06
Payer: MEDICARE

## 2023-07-06 VITALS — BODY MASS INDEX: 20.49 KG/M2 | HEIGHT: 64 IN | WEIGHT: 120 LBS

## 2023-07-06 PROCEDURE — 99212 OFFICE O/P EST SF 10 MIN: CPT | Mod: 25

## 2023-07-06 PROCEDURE — 20610 DRAIN/INJ JOINT/BURSA W/O US: CPT | Mod: 50

## 2023-07-06 NOTE — IMAGING

## 2023-07-06 NOTE — ASSESSMENT
[FreeTextEntry1] : \par - We discussed their diagnosis and treatment options at length including the risks and benefits of both surgical treatment with a knee replacement and non-surgical options.\par - We will continue conservative treatment with activity modification, PT, icing, weight loss, and anti-inflammatory medications.\par - The patient was provided with a PT prescription to work on ROM, hip ER/abductors strengthening, quad/hamstring stretches and strengthening, and other exercises \par - The patient was advised to let pain guide the gradual advancement of activities. \par - We also discussed the possibility of viscosupplementation injections and she would like to proceed\par - b/l knee euflexxa #2 - cami well\par \par

## 2023-07-06 NOTE — HISTORY OF PRESENT ILLNESS
[de-identified] : 73 year old female  (RHD, retired )  chronic ramiro knees L>R pain worsening  since 4/10/23 \par The pain is located  anterior, lateral , medial\par The pain is associated with  swelling, clicking\par Worse with activity and better at rest.\par Has tried ice, Tylenol , csi and visco in past\par H/O bl meniscus surgeries 2008, 2010\par **afib on Xarelto***\par \par 6/29/23 - here with continued b/l knee pain, b/l CSI (4/13), would like to discuss possible visco injections \par 7/6/23- b/l knee euflexxa #2

## 2023-07-17 ENCOUNTER — APPOINTMENT (OUTPATIENT)
Dept: ORTHOPEDIC SURGERY | Facility: CLINIC | Age: 73
End: 2023-07-17
Payer: MEDICARE

## 2023-07-17 PROCEDURE — 99212 OFFICE O/P EST SF 10 MIN: CPT | Mod: 25

## 2023-07-17 PROCEDURE — 20610 DRAIN/INJ JOINT/BURSA W/O US: CPT | Mod: 50

## 2023-07-17 NOTE — HISTORY OF PRESENT ILLNESS
[de-identified] : 73 year old female  (RHD, retired )  chronic ramiro knees L>R pain worsening  since 4/10/23 \par The pain is located  anterior, lateral , medial\par The pain is associated with  swelling, clicking\par Worse with activity and better at rest.\par Has tried ice, Tylenol , csi and visco in past\par H/O bl meniscus surgeries 2008, 2010\par **afib on Xarelto***\par \par 6/29/23 - here with continued b/l knee pain, b/l CSI (4/13), would like to discuss possible visco injections \par 7/6/23- b/l knee euflexxa #2\par 7/17/23- b/l knee euflexxa #3

## 2023-07-17 NOTE — ASSESSMENT
[FreeTextEntry1] : \par - We discussed their diagnosis and treatment options at length including the risks and benefits of both surgical treatment with a knee replacement and non-surgical options.\par - We will continue conservative treatment with activity modification, PT, icing, weight loss, and anti-inflammatory medications.\par - The patient was provided with a PT prescription to work on ROM, hip ER/abductors strengthening, quad/hamstring stretches and strengthening, and other exercises \par - The patient was advised to let pain guide the gradual advancement of activities. \par - We also discussed the possibility of viscosupplementation injections and she would like to proceed\par - b/l knee euflexxa #3 - cami well\par \par

## 2023-07-17 NOTE — IMAGING

## 2023-08-15 ENCOUNTER — APPOINTMENT (OUTPATIENT)
Dept: DERMATOLOGY | Facility: CLINIC | Age: 73
End: 2023-08-15

## 2023-08-21 ENCOUNTER — OFFICE (OUTPATIENT)
Dept: URBAN - METROPOLITAN AREA CLINIC 35 | Facility: CLINIC | Age: 73
Setting detail: OPHTHALMOLOGY
End: 2023-08-21
Payer: COMMERCIAL

## 2023-08-21 DIAGNOSIS — H35.3111: ICD-10-CM

## 2023-08-21 DIAGNOSIS — H11.153: ICD-10-CM

## 2023-08-21 DIAGNOSIS — H25.13: ICD-10-CM

## 2023-08-21 DIAGNOSIS — H11.823: ICD-10-CM

## 2023-08-21 DIAGNOSIS — H35.412: ICD-10-CM

## 2023-08-21 DIAGNOSIS — H35.62: ICD-10-CM

## 2023-08-21 DIAGNOSIS — H43.393: ICD-10-CM

## 2023-08-21 DIAGNOSIS — H35.373: ICD-10-CM

## 2023-08-21 PROCEDURE — 92014 COMPRE OPH EXAM EST PT 1/>: CPT | Performed by: OPHTHALMOLOGY

## 2023-08-21 PROCEDURE — 92250 FUNDUS PHOTOGRAPHY W/I&R: CPT | Performed by: OPHTHALMOLOGY

## 2023-08-21 ASSESSMENT — KERATOMETRY
OD_K2POWER_DIOPTERS: 45.00
OS_K2POWER_DIOPTERS: 45.00
OS_AXISANGLE_DEGREES: 069
METHOD_AUTO_MANUAL: AUTO
OD_AXISANGLE_DEGREES: 117
OD_K1POWER_DIOPTERS: 44.50
OS_K1POWER_DIOPTERS: 44.25

## 2023-08-21 ASSESSMENT — AXIALLENGTH_DERIVED
OD_AL: 23.0018
OS_AL: 22.8611
OS_AL: 22.77
OS_AL: 22.77
OD_AL: 22.7274
OS_AL: 22.8155
OD_AL: 22.3714
OD_AL: 22.7726
OD_AL: 22.6822
OD_AL: 22.8638
OS_AL: 22.77
OS_AL: 22.7248
OD_AL: 22.8181
OS_AL: 22.7248

## 2023-08-21 ASSESSMENT — REFRACTION_MANIFEST
OS_AXIS: 095
OS_SPHERE: +0.75
OD_VA1: 20/25
OD_SPHERE: +1.00
OD_AXIS: 010
OD_AXIS: 015
OS_AXIS: 100
OS_VA1: 20/40+3
OS_CYLINDER: -1.00
OD_SPHERE: +0.50
OS_CYLINDER: +1.00
OD_SPHERE: +1.25
OS_AXIS: 107
OD_VA1: 20/40+
OD_CYLINDER: -0.50
OD_SPHERE: +1.25
OS_AXIS: 010
OS_AXIS: 100
OS_SPHERE: +1.25
OD_SPHERE: +0.75
OD_CYLINDER: -0.50
OD_VA1: 20/30-2
OD_AXIS: 055
OD_VA1: 20/30-2
OS_SPHERE: +1.75
OD_CYLINDER: -0.75
OD_AXIS: 070
OS_SPHERE: +1.50
OD_AXIS: 085
OS_AXIS: 100
OS_CYLINDER: -0.75
OS_SPHERE: +1.50
OS_VA1: 20/40+
OD_SPHERE: +1.50
OD_VA1: 20/30
OS_SPHERE: +1.50
OS_VA1: 20/20
OS_CYLINDER: -1.25
OD_CYLINDER: -0.50
OD_AXIS: 095
OD_VA1: 20/40+3
OS_CYLINDER: -0.75
OD_CYLINDER: +1.25
OS_CYLINDER: -0.75
OD_CYLINDER: -0.75
OS_VA1: 20/40

## 2023-08-21 ASSESSMENT — SPHEQUIV_DERIVED
OS_SPHEQUIV: 1
OD_SPHEQUIV: 1
OS_SPHEQUIV: 1.125
OD_SPHEQUIV: 1.125
OD_SPHEQUIV: 1.25
OS_SPHEQUIV: 1.25
OD_SPHEQUIV: 2.125
OS_SPHEQUIV: 1.25
OS_SPHEQUIV: 1.125
OD_SPHEQUIV: 0.375
OD_SPHEQUIV: 0.75
OD_SPHEQUIV: 0.875
OS_SPHEQUIV: 0.875
OS_SPHEQUIV: 1.125

## 2023-08-21 ASSESSMENT — REFRACTION_CURRENTRX
OD_VPRISM_DIRECTION: SV
OS_VPRISM_DIRECTION: SV
OS_OVR_VA: 20/
OD_SPHERE: +2.75
OD_AXIS: 092
OS_AXIS: 092
OS_SPHERE: +2.50
OD_OVR_VA: 20/
OD_CYLINDER: -0.25
OS_CYLINDER: -0.25

## 2023-08-21 ASSESSMENT — VISUAL ACUITY
OD_BCVA: 20/50
OS_BCVA: 20/50

## 2023-08-21 ASSESSMENT — TONOMETRY
OS_IOP_MMHG: 15
OD_IOP_MMHG: 15

## 2023-08-21 ASSESSMENT — REFRACTION_AUTOREFRACTION
OS_CYLINDER: -1.50
OS_SPHERE: +2.00
OD_SPHERE: +3.50
OD_CYLINDER: -2.75
OD_AXIS: 042
OS_AXIS: 113

## 2023-08-21 ASSESSMENT — CONFRONTATIONAL VISUAL FIELD TEST (CVF)
OD_FINDINGS: FULL
OS_FINDINGS: FULL

## 2023-10-17 ENCOUNTER — OFFICE (OUTPATIENT)
Dept: URBAN - METROPOLITAN AREA CLINIC 35 | Facility: CLINIC | Age: 73
Setting detail: OPHTHALMOLOGY
End: 2023-10-17
Payer: COMMERCIAL

## 2023-10-17 DIAGNOSIS — H25.11: ICD-10-CM

## 2023-10-17 DIAGNOSIS — H25.13: ICD-10-CM

## 2023-10-17 DIAGNOSIS — H35.373: ICD-10-CM

## 2023-10-17 PROCEDURE — 92134 CPTRZ OPH DX IMG PST SGM RTA: CPT | Performed by: OPHTHALMOLOGY

## 2023-10-17 PROCEDURE — 92136 OPHTHALMIC BIOMETRY: CPT | Mod: RT | Performed by: OPHTHALMOLOGY

## 2023-10-17 PROCEDURE — 99213 OFFICE O/P EST LOW 20 MIN: CPT | Performed by: OPHTHALMOLOGY

## 2023-10-17 ASSESSMENT — AXIALLENGTH_DERIVED
OD_AL: 22.6822
OS_AL: 22.77
OS_AL: 22.6348
OD_AL: 22.7726
OD_AL: 22.7274
OS_AL: 22.77
OS_AL: 22.8155
OD_AL: 22.8638
OD_AL: 22.8181
OS_AL: 22.7248
OD_AL: 23.0018
OS_AL: 22.8611
OS_AL: 22.77

## 2023-10-17 ASSESSMENT — REFRACTION_MANIFEST
OD_VA1: 20/40+3
OD_CYLINDER: -0.75
OS_AXIS: 100
OS_CYLINDER: -0.75
OS_SPHERE: +1.50
OS_SPHERE: +1.50
OS_CYLINDER: -0.75
OS_AXIS: 095
OD_AXIS: 015
OS_SPHERE: +1.25
OD_VA1: 20/30
OS_VA1: 20/40+3
OD_SPHERE: +1.00
OD_SPHERE: +0.75
OD_VA1: 20/30-2
OD_AXIS: 010
OD_AXIS: 095
OD_VA1: 20/25
OD_AXIS: 055
OS_SPHERE: +0.75
OD_SPHERE: +0.50
OD_CYLINDER: -0.50
OD_CYLINDER: -0.75
OD_CYLINDER: +1.25
OD_VA1: 20/30-2
OS_AXIS: 100
OS_SPHERE: +1.50
OS_AXIS: 107
OD_SPHERE: +1.25
OS_CYLINDER: -1.25
OD_CYLINDER: -0.50
OD_SPHERE: +1.25
OS_CYLINDER: -1.00
OS_AXIS: 100
OS_VA1: 20/20
OS_AXIS: 010
OD_AXIS: 085
OS_VA1: 20/40
OS_SPHERE: +1.75
OD_CYLINDER: -0.50
OS_CYLINDER: -0.75
OD_AXIS: 070
OD_VA1: 20/40+
OS_CYLINDER: +1.00
OD_SPHERE: +1.50
OS_VA1: 20/40+

## 2023-10-17 ASSESSMENT — REFRACTION_CURRENTRX
OS_SPHERE: +2.50
OS_CYLINDER: -0.25
OD_OVR_VA: 20/
OD_AXIS: 094
OS_AXIS: 091
OS_VPRISM_DIRECTION: SV
OD_VPRISM_DIRECTION: SV
OD_SPHERE: +2.75
OD_CYLINDER: -0.25
OS_OVR_VA: 20/

## 2023-10-17 ASSESSMENT — SPHEQUIV_DERIVED
OS_SPHEQUIV: 1.5
OD_SPHEQUIV: 1.125
OS_SPHEQUIV: 0.875
OS_SPHEQUIV: 1.25
OD_SPHEQUIV: 1.25
OS_SPHEQUIV: 1.125
OD_SPHEQUIV: 0.375
OS_SPHEQUIV: 1
OD_SPHEQUIV: 0.875
OS_SPHEQUIV: 1.125
OD_SPHEQUIV: 1
OS_SPHEQUIV: 1.125
OD_SPHEQUIV: 0.75

## 2023-10-17 ASSESSMENT — KERATOMETRY
OS_K1POWER_DIOPTERS: 44.25
OS_K2POWER_DIOPTERS: 45.00
OD_AXISANGLE_DEGREES: 117
OS_AXISANGLE_DEGREES: 069
METHOD_AUTO_MANUAL: AUTO
OD_K2POWER_DIOPTERS: 45.00
OD_K1POWER_DIOPTERS: 44.50

## 2023-10-17 ASSESSMENT — REFRACTION_AUTOREFRACTION
OS_CYLINDER: -1.00
OD_SPHERE: ERROR
OS_AXIS: 100
OS_SPHERE: +2.00

## 2023-10-17 ASSESSMENT — VISUAL ACUITY
OD_BCVA: 20/30-2
OS_BCVA: 20/60+

## 2023-10-17 ASSESSMENT — TONOMETRY: OD_IOP_MMHG: 15

## 2023-10-20 ENCOUNTER — AMBUL SURGICAL CARE (OUTPATIENT)
Dept: URBAN - METROPOLITAN AREA SURGERY 1 | Facility: SURGERY | Age: 73
Setting detail: OPHTHALMOLOGY
End: 2023-10-20
Payer: COMMERCIAL

## 2023-10-20 DIAGNOSIS — H25.12: ICD-10-CM

## 2023-10-20 DIAGNOSIS — H52.212: ICD-10-CM

## 2023-10-20 PROCEDURE — 66984 XCAPSL CTRC RMVL W/O ECP: CPT | Mod: LT | Performed by: OPHTHALMOLOGY

## 2023-10-20 PROCEDURE — FEMTO WITH CATARACT LASER: Performed by: OPHTHALMOLOGY

## 2023-10-21 ENCOUNTER — RX ONLY (RX ONLY)
Age: 73
End: 2023-10-21

## 2023-10-21 ENCOUNTER — OFFICE (OUTPATIENT)
Dept: URBAN - METROPOLITAN AREA CLINIC 35 | Facility: CLINIC | Age: 73
Setting detail: OPHTHALMOLOGY
End: 2023-10-21
Payer: COMMERCIAL

## 2023-10-21 DIAGNOSIS — Z96.1: ICD-10-CM

## 2023-10-21 PROCEDURE — 99024 POSTOP FOLLOW-UP VISIT: CPT | Performed by: OPHTHALMOLOGY

## 2023-10-21 ASSESSMENT — REFRACTION_MANIFEST
OS_CYLINDER: -1.25
OS_SPHERE: +0.75
OS_AXIS: 107
OD_AXIS: 015
OD_SPHERE: +0.75
OD_CYLINDER: -0.75
OD_VA1: 20/30-2
OS_CYLINDER: -0.75
OD_CYLINDER: -0.50
OD_AXIS: 070
OD_VA1: 20/25
OD_VA1: 20/30
OD_SPHERE: +1.50
OS_AXIS: 010
OS_VA1: 20/40
OD_CYLINDER: -0.50
OS_CYLINDER: -1.00
OS_VA1: 20/40+
OS_AXIS: 095
OD_AXIS: 095
OD_SPHERE: +1.00
OD_CYLINDER: -0.75
OS_SPHERE: +1.50
OS_SPHERE: +1.50
OD_SPHERE: +1.25
OS_CYLINDER: -0.75
OS_VA1: 20/20
OD_VA1: 20/40+
OD_AXIS: 085
OD_CYLINDER: -0.50
OS_SPHERE: +1.25
OD_VA1: 20/30-2
OD_SPHERE: +1.25
OS_SPHERE: +1.50
OD_CYLINDER: +1.25
OS_VA1: 20/40+3
OS_CYLINDER: -0.75
OS_AXIS: 100
OD_VA1: 20/40+3
OD_SPHERE: +0.50
OD_AXIS: 055
OS_SPHERE: +1.75
OS_AXIS: 100
OS_AXIS: 100
OS_CYLINDER: +1.00
OD_AXIS: 010

## 2023-10-21 ASSESSMENT — KERATOMETRY
METHOD_AUTO_MANUAL: AUTO
OD_AXISANGLE_DEGREES: 117
OS_AXISANGLE_DEGREES: 069
OS_K1POWER_DIOPTERS: 44.25
OD_K1POWER_DIOPTERS: 44.50
OS_K2POWER_DIOPTERS: 45.00
OD_K2POWER_DIOPTERS: 45.00

## 2023-10-21 ASSESSMENT — REFRACTION_AUTOREFRACTION
OD_SPHERE: ERROR
OS_SPHERE: +2.00
OS_CYLINDER: -1.00
OS_AXIS: 100

## 2023-10-21 ASSESSMENT — SPHEQUIV_DERIVED
OD_SPHEQUIV: 0.375
OD_SPHEQUIV: 0.75
OS_SPHEQUIV: 1.5
OS_SPHEQUIV: 1.125
OS_SPHEQUIV: 1.25
OD_SPHEQUIV: 1.125
OS_SPHEQUIV: 0.875
OD_SPHEQUIV: 1
OS_SPHEQUIV: 1.125
OS_SPHEQUIV: 1.125
OD_SPHEQUIV: 1.25
OD_SPHEQUIV: 0.875
OS_SPHEQUIV: 1

## 2023-10-21 ASSESSMENT — AXIALLENGTH_DERIVED
OS_AL: 22.77
OD_AL: 22.8638
OS_AL: 22.7248
OD_AL: 22.6822
OS_AL: 22.77
OD_AL: 22.7274
OD_AL: 23.0018
OS_AL: 22.8611
OD_AL: 22.8181
OS_AL: 22.77
OS_AL: 22.8155
OS_AL: 22.6348
OD_AL: 22.7726

## 2023-10-21 ASSESSMENT — REFRACTION_CURRENTRX
OD_CYLINDER: -0.25
OD_VPRISM_DIRECTION: SV
OS_SPHERE: +2.50
OS_AXIS: 091
OS_OVR_VA: 20/
OD_OVR_VA: 20/
OS_VPRISM_DIRECTION: SV
OD_SPHERE: +2.75
OD_AXIS: 094
OS_CYLINDER: -0.25

## 2023-10-21 ASSESSMENT — TONOMETRY: OD_IOP_MMHG: 19

## 2023-10-21 ASSESSMENT — VISUAL ACUITY
OS_BCVA: 20/25-3
OD_BCVA: 20/40

## 2023-10-30 ENCOUNTER — OFFICE (OUTPATIENT)
Dept: URBAN - METROPOLITAN AREA CLINIC 35 | Facility: CLINIC | Age: 73
Setting detail: OPHTHALMOLOGY
End: 2023-10-30
Payer: COMMERCIAL

## 2023-10-30 DIAGNOSIS — Z96.1: ICD-10-CM

## 2023-10-30 PROBLEM — H11.151 PINGUECULA; RIGHT EYE: Status: ACTIVE | Noted: 2023-10-21

## 2023-10-30 PROBLEM — H25.12 CATARACT SENILE NUCLEAR SCLEROSIS; LEFT EYE: Status: ACTIVE | Noted: 2023-10-21

## 2023-10-30 PROCEDURE — 99024 POSTOP FOLLOW-UP VISIT: CPT | Performed by: OPHTHALMOLOGY

## 2023-10-30 ASSESSMENT — AXIALLENGTH_DERIVED
OS_AL: 22.77
OD_AL: 23.0018
OD_AL: 22.7726
OS_AL: 22.7248
OS_AL: 22.8611
OS_AL: 22.77
OS_AL: 22.77
OS_AL: 22.6348
OD_AL: 22.8638
OS_AL: 22.8155
OD_AL: 22.8181
OD_AL: 23.3782
OD_AL: 22.6822
OD_AL: 22.7274

## 2023-10-30 ASSESSMENT — SPHEQUIV_DERIVED
OD_SPHEQUIV: -0.625
OD_SPHEQUIV: 1.25
OD_SPHEQUIV: 0.75
OS_SPHEQUIV: 1.125
OS_SPHEQUIV: 1
OD_SPHEQUIV: 0.875
OS_SPHEQUIV: 1.5
OS_SPHEQUIV: 0.875
OS_SPHEQUIV: 1.25
OD_SPHEQUIV: 1
OS_SPHEQUIV: 1.125
OS_SPHEQUIV: 1.125
OD_SPHEQUIV: 1.125
OD_SPHEQUIV: 0.375

## 2023-10-30 ASSESSMENT — REFRACTION_MANIFEST
OD_CYLINDER: +1.25
OS_SPHERE: +1.50
OS_AXIS: 100
OD_CYLINDER: -0.50
OD_CYLINDER: -0.50
OS_VA1: 20/40
OS_AXIS: 010
OS_VA1: 20/40+3
OD_AXIS: 085
OS_AXIS: 095
OS_VA1: 20/20
OS_SPHERE: +1.75
OS_SPHERE: +1.50
OS_SPHERE: +1.50
OD_SPHERE: +1.25
OS_SPHERE: +0.75
OS_CYLINDER: -0.75
OD_SPHERE: +0.75
OD_SPHERE: +1.00
OD_AXIS: 070
OD_SPHERE: +1.50
OD_VA1: 20/40+
OS_AXIS: 100
OD_CYLINDER: -0.75
OD_AXIS: 095
OS_CYLINDER: -0.75
OD_AXIS: 015
OS_CYLINDER: -1.25
OD_VA1: 20/30-2
OD_VA1: 20/30-2
OD_VA1: 20/40+3
OS_SPHERE: +1.25
OS_AXIS: 107
OD_AXIS: 010
OD_SPHERE: +1.25
OS_CYLINDER: -0.75
OD_AXIS: 055
OD_CYLINDER: -0.50
OS_AXIS: 100
OS_CYLINDER: -1.00
OS_CYLINDER: +1.00
OD_VA1: 20/30
OS_VA1: 20/40+
OD_CYLINDER: -0.75
OD_SPHERE: +0.50
OD_VA1: 20/25

## 2023-10-30 ASSESSMENT — REFRACTION_AUTOREFRACTION
OD_AXIS: 005
OS_CYLINDER: -1.50
OD_CYLINDER: -0.25
OD_SPHERE: -0.50
OS_SPHERE: +2.25
OS_AXIS: 102

## 2023-10-30 ASSESSMENT — REFRACTION_CURRENTRX
OD_VPRISM_DIRECTION: SV
OD_CYLINDER: -0.25
OD_SPHERE: +2.75
OS_CYLINDER: -0.25
OS_SPHERE: +2.50
OS_OVR_VA: 20/
OS_AXIS: 091
OD_OVR_VA: 20/
OS_VPRISM_DIRECTION: SV
OD_AXIS: 094

## 2023-10-30 ASSESSMENT — KERATOMETRY
OS_K1POWER_DIOPTERS: 44.50
OD_K2POWER_DIOPTERS: 45.00
OD_AXISANGLE_DEGREES: 087
OS_AXISANGLE_DEGREES: 082
OS_K2POWER_DIOPTERS: 44.75
METHOD_AUTO_MANUAL: AUTO
OD_K1POWER_DIOPTERS: 44.50

## 2023-10-30 ASSESSMENT — VISUAL ACUITY
OD_BCVA: 20/30
OS_BCVA: 20/20-1

## 2023-10-30 ASSESSMENT — CONFRONTATIONAL VISUAL FIELD TEST (CVF)
OS_FINDINGS: FULL
OD_FINDINGS: FULL

## 2023-10-30 ASSESSMENT — TONOMETRY: OD_IOP_MMHG: 15

## 2023-11-09 ENCOUNTER — APPOINTMENT (OUTPATIENT)
Dept: ORTHOPEDIC SURGERY | Facility: CLINIC | Age: 73
End: 2023-11-09
Payer: MEDICARE

## 2023-11-09 DIAGNOSIS — M17.12 UNILATERAL PRIMARY OSTEOARTHRITIS, LEFT KNEE: ICD-10-CM

## 2023-11-09 DIAGNOSIS — M17.11 UNILATERAL PRIMARY OSTEOARTHRITIS, RIGHT KNEE: ICD-10-CM

## 2023-11-09 PROCEDURE — J3490M: CUSTOM

## 2023-11-09 PROCEDURE — 99214 OFFICE O/P EST MOD 30 MIN: CPT | Mod: 25

## 2023-11-09 PROCEDURE — 20610 DRAIN/INJ JOINT/BURSA W/O US: CPT | Mod: 50

## 2023-12-01 ENCOUNTER — OFFICE (OUTPATIENT)
Dept: URBAN - METROPOLITAN AREA CLINIC 35 | Facility: CLINIC | Age: 73
Setting detail: OPHTHALMOLOGY
End: 2023-12-01
Payer: MEDICARE

## 2023-12-01 DIAGNOSIS — Z96.1: ICD-10-CM

## 2023-12-01 PROCEDURE — 99024 POSTOP FOLLOW-UP VISIT: CPT | Performed by: OPHTHALMOLOGY

## 2023-12-01 ASSESSMENT — REFRACTION_MANIFEST
OD_CYLINDER: -0.50
OS_AXIS: 095
OS_SPHERE: +1.50
OD_SPHERE: +1.50
OD_CYLINDER: -0.50
OD_CYLINDER: +1.25
OD_AXIS: 070
OS_AXIS: 100
OD_VA1: 20/40+3
OD_CYLINDER: -0.75
OD_SPHERE: +0.50
OD_SPHERE: +1.00
OD_AXIS: 015
OS_AXIS: 100
OD_CYLINDER: -0.25
OS_SPHERE: +1.25
OS_SPHERE: +1.50
OD_VA1: 20/30-2
OD_SPHERE: +1.25
OS_AXIS: 010
OS_SPHERE: +1.75
OS_CYLINDER: -0.75
OD_AXIS: 085
OD_AXIS: 050
OD_AXIS: 010
OS_AXIS: 107
OD_VA1: 20/30
OD_CYLINDER: -0.75
OD_VA1: 20/30-2
OS_CYLINDER: -0.75
OS_VA1: 20/40+
OS_VA1: 20/40+3
OS_VA1: 20/20
OS_SPHERE: +0.75
OS_VA1: 20/40
OD_VA1: 20/25
OS_SPHERE: +1.50
OD_AXIS: 055
OD_AXIS: 095
OS_CYLINDER: -0.75
OS_AXIS: 100
OS_CYLINDER: -1.00
OD_SPHERE: -0.25
OD_VA1: 20/40+
OD_SPHERE: +1.25
OS_CYLINDER: +1.00
OD_VA1: 20/25
OD_SPHERE: +0.75
OS_CYLINDER: -1.25
OD_CYLINDER: -0.50

## 2023-12-01 ASSESSMENT — SPHEQUIV_DERIVED
OD_SPHEQUIV: 0.375
OD_SPHEQUIV: 1.125
OS_SPHEQUIV: 1.125
OS_SPHEQUIV: 1.25
OD_SPHEQUIV: -0.375
OS_SPHEQUIV: 1.125
OD_SPHEQUIV: 1.25
OS_SPHEQUIV: 1
OS_SPHEQUIV: 1.125
OD_SPHEQUIV: 0.75
OD_SPHEQUIV: 1
OD_SPHEQUIV: 0.875
OS_SPHEQUIV: 1.5
OD_SPHEQUIV: -0.375
OS_SPHEQUIV: 0.875

## 2023-12-01 ASSESSMENT — CONFRONTATIONAL VISUAL FIELD TEST (CVF)
OD_FINDINGS: FULL
OS_FINDINGS: FULL

## 2023-12-01 ASSESSMENT — REFRACTION_CURRENTRX
OS_AXIS: 091
OD_VPRISM_DIRECTION: SV
OS_SPHERE: +2.50
OS_CYLINDER: -0.25
OS_OVR_VA: 20/
OD_SPHERE: +2.75
OD_OVR_VA: 20/
OD_CYLINDER: -0.25
OD_AXIS: 094
OS_VPRISM_DIRECTION: SV

## 2023-12-01 ASSESSMENT — REFRACTION_AUTOREFRACTION
OS_CYLINDER: -1.00
OS_SPHERE: +2.00
OD_AXIS: 056
OS_AXIS: 106
OD_SPHERE: -0.25
OD_CYLINDER: -0.25

## 2024-01-31 ENCOUNTER — NON-APPOINTMENT (OUTPATIENT)
Age: 74
End: 2024-01-31

## 2024-02-01 ENCOUNTER — APPOINTMENT (OUTPATIENT)
Dept: ORTHOPEDIC SURGERY | Facility: CLINIC | Age: 74
End: 2024-02-01
Payer: MEDICARE

## 2024-02-01 DIAGNOSIS — M19.021 PRIMARY OSTEOARTHRITIS, RIGHT ELBOW: ICD-10-CM

## 2024-02-01 DIAGNOSIS — S46.011A STRAIN OF MUSCLE(S) AND TENDON(S) OF THE ROTATOR CUFF OF RIGHT SHOULDER, INITIAL ENCOUNTER: ICD-10-CM

## 2024-02-01 PROCEDURE — 73030 X-RAY EXAM OF SHOULDER: CPT | Mod: RT

## 2024-02-01 PROCEDURE — 99204 OFFICE O/P NEW MOD 45 MIN: CPT

## 2024-02-01 NOTE — ASSESSMENT
[FreeTextEntry1] : Reviewed outside MRI R shoulder, XR R elbow, and prior records. The condition was explained to the patient.  Discussed that arthritis is a progressive degenerative process, and symptoms may have a waxing/waning course, which may be exacerbated by activity or trauma. Discussed treatment options - activity modification, bracing, steroid injection, or last resort surgery. Discussed increased propensity for stiffness due to underlying arthritis.  - prescribed PT for R shoulder.  F/u with shoulder specialist in 4-6 weeks.

## 2024-02-01 NOTE — HISTORY OF PRESENT ILLNESS
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 c/o pain with certain yoga maneuvers. Denies injury. Saw Dr. Brito on 22 for BILATERAL shoulder pain => XR bilateral shoulders => Dx bilateral glenohumeral arthritis => CSI. patient reports LEFT improved, but RIGHT did not. PCP ordered MRI R shoulder, XR R elbow. Wearing wrist brace for R elbow pain. Not currently taking any medication for this.  MRI R shoulder @[facility] 24: 1. High-grade partial-thickness supraspinatus tendon tear. 2. Full-thickness glenohumeral articular cartilage loss with diffuse marrow edema and contusion throughout the humeral head and glenoid. 3. Moderate glenohumeral joint effusion. Small loose bodies within the joint space.  4. Mild subacromial/subdeltoid bursitis. 5. BIceps tenosynovitis.  Hx: Afib - on Xarelto. HTN. Sx: bilateral knee meniscus surgery. umbilical hernia repair.  x 2. [] : no [FreeTextEntry5] : MACY is a [ RHD ] F here today for right elbow/ shoulder. Andrea trauma. stated pain started couple years ago. Seen her pcp, stated she had tear in rotator cuff and arthritis in the elbow. Andrea n/t.  [de-identified] : 01/25/24 [de-identified] : pcp  [de-identified] : xr/ mri

## 2024-02-01 NOTE — IMAGING
[de-identified] : RIGHT SHOULDER No swelling. TTP diffusely to anterior/posterior shoulder. FF: 90, ER 20, IR to back pocket. strength testing deferred due to pain.  RIGHT ELBOW skin intact. no swelling. no TTP.  elbow ROM: good extension, flexion. good pronation, supination.  Sensation intact to light touch. palpable radial pulse.   XRAYS OF RIGHT SHOULDER: severe djd of glenohumeral joint. djd of AC joint. XRAYS OF RIGHT ELBOW @[facility] 1/19/24: no acute displaced fracture or dislocation. djd of radiocapitellar and ulnohumeral joint. multiple calcifications adjacent to lateral epicondyle.

## 2024-03-05 ENCOUNTER — APPOINTMENT (OUTPATIENT)
Dept: ORTHOPEDIC SURGERY | Facility: CLINIC | Age: 74
End: 2024-03-05
Payer: MEDICARE

## 2024-03-05 VITALS — BODY MASS INDEX: 20.49 KG/M2 | HEIGHT: 64 IN | WEIGHT: 120 LBS

## 2024-03-05 VITALS — HEIGHT: 64 IN | BODY MASS INDEX: 20.49 KG/M2 | WEIGHT: 120 LBS

## 2024-03-05 PROCEDURE — J3490M: CUSTOM

## 2024-03-05 PROCEDURE — 99215 OFFICE O/P EST HI 40 MIN: CPT | Mod: 25

## 2024-03-05 PROCEDURE — 20611 DRAIN/INJ JOINT/BURSA W/US: CPT | Mod: RT

## 2024-03-05 NOTE — DATA REVIEWED
[MRI] : MRI [Right] : of the right [Outside X-rays] : outside x-rays [Shoulder] : shoulder [I independently reviewed and interpreted images and report] : I independently reviewed and interpreted images and report [FreeTextEntry1] : Carmen DUMONT, HERNANDEZ

## 2024-03-05 NOTE — HISTORY OF PRESENT ILLNESS
[Right Arm] : right arm [de-identified] : 03/05/2024: 74 y/o RHD female presents with chronic right shoulder pain. Describes anterior shoulder pain with limited ROM. Worse with reaching to the back. Wakes from sleep. She has been seen by ortho and treated with PT.  Reports an injection years ago with minimal reilef.  Limited NSAIDs due to afib.  PMHx: HTN, HLD, AFib [FreeTextEntry1] : shoulder

## 2024-03-05 NOTE — ASSESSMENT
[FreeTextEntry1] : R MARÍA ELENA JULIA.  Xrays and MRI reviewed: MARÍA ELENA DUMONT, PRCT Discussed op versus non op tx, including the r/b/a/c of both. Discussed rehab and recovery after TSA/RSA. Discussed timing, frequency and efficacy of cortisone injections. Discussed risks of repeated cortisone injections.  Discussed trial of visco supplementation. She cant take NSAIDs due to Afib.  R GH injection tolerated well. Will submit for Orthovisc injections. Return when visco approved.  Procedure Note: Large Joint Injection was performed because of pain and inflammation, failure of conservative treatment.   Medications: Depo-Medrol: 1 cc, 80 mg. Lidocaine: 2 cc, 1%.  Marcaine: 2 cc, .25%.   Medication was injected in the right glenohumeral joint. Patient has tried OTC's including aspirin, Ibuprofen, Aleve etc or prescription NSAIDs, and/or exercises at home and/ or physical therapy without satisfactory response. The risks, benefits, and alternatives to cortisone injection were explained in full to the patient. Risks outlined include but are not limited to infection, sepsis, bleeding, scarring, skin discoloration, temporary increase in pain, syncopal episode, failure to resolve symptoms, allergic reaction, symptom recurrence, and elevation of blood sugar in diabetics. Patient understood the risks. All questions were answered. After discussion of options, patient requested an injection. Oral informed consent was obtained and sterile prep of the injection site was performed using alcohol. Sterile technique was utilized for the procedure including the preparation of the solutions used for the injection. Ethyl chloride spray was used topically.  Sterile technique used. Patient tolerated procedure well. Post Procedure Instructions: Patient was advised to call if redness, pain, or fever occur and apply ice for 15 min. out of every hour for the next 12-24 hours as tolerated. patient was advised to rest the joint(s) for 2 days.  Ultrasound Guidance was used for the following reasons: for Glenohumeral injection.  Ultrasound guided injection was performed of the shoulder, visualization of the needle and placement of injection was performed without complication.

## 2024-03-05 NOTE — PHYSICAL EXAM
[Right] : right shoulder [5 ___] : forward flexion 5[unfilled]/5 [5___] : external rotation 5[unfilled]/5 [] : no sensory deficits [FreeTextEntry9] : FF R130 L150 ER R40 L40

## 2024-03-19 ENCOUNTER — OFFICE (OUTPATIENT)
Dept: URBAN - METROPOLITAN AREA CLINIC 35 | Facility: CLINIC | Age: 74
Setting detail: OPHTHALMOLOGY
End: 2024-03-19
Payer: MEDICARE

## 2024-03-19 DIAGNOSIS — H25.12: ICD-10-CM

## 2024-03-19 PROBLEM — H11.153 PINGUECULA; BOTH EYES: Status: ACTIVE | Noted: 2024-03-19

## 2024-03-19 PROCEDURE — 99214 OFFICE O/P EST MOD 30 MIN: CPT | Performed by: OPHTHALMOLOGY

## 2024-03-19 ASSESSMENT — REFRACTION_MANIFEST
OS_VA1: 20/40+3
OD_AXIS: 015
OS_VA1: 20/40+
OS_AXIS: 010
OS_SPHERE: +1.50
OS_CYLINDER: -1.25
OS_SPHERE: +1.75
OS_AXIS: 107
OD_VA1: 20/30
OD_AXIS: 095
OS_VA1: 20/20
OD_AXIS: 050
OD_CYLINDER: +1.25
OS_CYLINDER: -0.75
OD_VA1: 20/30-2
OS_VA1: 20/40
OS_CYLINDER: -0.75
OD_AXIS: 010
OS_SPHERE: +1.50
OD_CYLINDER: -0.50
OD_VA1: 20/40+3
OS_SPHERE: +1.25
OS_AXIS: 100
OD_AXIS: 085
OD_SPHERE: -0.25
OS_SPHERE: +1.50
OD_AXIS: 055
OD_AXIS: 070
OS_AXIS: 095
OD_VA1: 20/25
OD_CYLINDER: -0.75
OS_CYLINDER: -0.75
OD_VA1: 20/30-2
OD_CYLINDER: -0.75
OD_VA1: 20/40+
OD_CYLINDER: -0.25
OS_AXIS: 100
OS_SPHERE: +0.75
OD_CYLINDER: -0.50
OD_SPHERE: +0.50
OD_SPHERE: +1.25
OS_CYLINDER: -1.00
OD_SPHERE: +1.00
OD_SPHERE: +0.75
OD_CYLINDER: -0.50
OS_AXIS: 100
OS_CYLINDER: +1.00
OD_SPHERE: +1.50
OD_VA1: 20/25
OD_SPHERE: +1.25

## 2024-03-19 ASSESSMENT — SPHEQUIV_DERIVED
OD_SPHEQUIV: 1.25
OS_SPHEQUIV: 1.25
OS_SPHEQUIV: 1.125
OS_SPHEQUIV: 0.875
OD_SPHEQUIV: 0.375
OD_SPHEQUIV: 1.125
OD_SPHEQUIV: -0.375
OD_SPHEQUIV: 0.875
OD_SPHEQUIV: 0.75
OD_SPHEQUIV: 1
OS_SPHEQUIV: 1

## 2024-03-19 ASSESSMENT — REFRACTION_CURRENTRX
OD_VPRISM_DIRECTION: SV
OS_AXIS: 091
OS_CYLINDER: -0.25
OD_AXIS: 094
OD_OVR_VA: 20/
OS_VPRISM_DIRECTION: SV
OS_SPHERE: +2.50
OS_OVR_VA: 20/
OD_SPHERE: +2.75
OD_CYLINDER: -0.25

## 2024-03-26 ENCOUNTER — APPOINTMENT (OUTPATIENT)
Dept: ORTHOPEDIC SURGERY | Facility: CLINIC | Age: 74
End: 2024-03-26
Payer: MEDICARE

## 2024-03-26 VITALS — WEIGHT: 120 LBS | BODY MASS INDEX: 20.49 KG/M2 | HEIGHT: 64 IN

## 2024-03-26 PROCEDURE — 99213 OFFICE O/P EST LOW 20 MIN: CPT

## 2024-03-26 NOTE — HISTORY OF PRESENT ILLNESS
[de-identified] : 3/26/24: Here for follow up. She states injection last visit gave some pain relief. There is less pain with certain motions. She is in PT which helps.  03/05/2024: 74 y/o RHD female presents with chronic right shoulder pain. Describes anterior shoulder pain with limited ROM. Worse with reaching to the back. Wakes from sleep. She has been seen by ortho and treated with PT.  Reports an injection years ago with minimal reilef.  Limited NSAIDs due to afib.  PMHx: HTN, HLD, AFib

## 2024-03-26 NOTE — ASSESSMENT
[FreeTextEntry1] : R MARÍA ELENA DUMONT.  Xrays and MRI reviewed: MARÍA ELENA DUMONT, PRCT Discussed op versus non op tx, including the r/b/a/c of both. Discussed rehab and recovery after TSA/RSA. Discussed timing, frequency and efficacy of cortisone injections. Discussed risks of repeated cortisone injections.  Discussed trial of visco supplementation. She cant take NSAIDs due to Afib.  R GH injection 3/5/24, good relief. Orthovisc was denied. She is in PT, will continue. Discussed Visco-3 series. RTO prn.

## 2024-03-29 ENCOUNTER — TRANSCRIPTION ENCOUNTER (OUTPATIENT)
Age: 74
End: 2024-03-29

## 2024-04-09 ENCOUNTER — APPOINTMENT (OUTPATIENT)
Dept: ORTHOPEDIC SURGERY | Facility: CLINIC | Age: 74
End: 2024-04-09
Payer: MEDICARE

## 2024-04-09 VITALS — WEIGHT: 120 LBS | HEIGHT: 64 IN | BODY MASS INDEX: 20.49 KG/M2

## 2024-04-09 PROCEDURE — 99213 OFFICE O/P EST LOW 20 MIN: CPT | Mod: 25

## 2024-04-09 PROCEDURE — 20611 DRAIN/INJ JOINT/BURSA W/US: CPT | Mod: RT

## 2024-04-09 NOTE — HISTORY OF PRESENT ILLNESS
[de-identified] : 4/9/24: Here for R shoulder Visco-3 #1.  3/26/24: Here for follow up. She states injection last visit gave some pain relief. There is less pain with certain motions. She is in PT which helps.  03/05/2024: 74 y/o RHD female presents with chronic right shoulder pain. Describes anterior shoulder pain with limited ROM. Worse with reaching to the back. Wakes from sleep. She has been seen by ortho and treated with PT.  Reports an injection years ago with minimal reilef.  Limited NSAIDs due to afib.  PMHx: HTN, HLD, AFib

## 2024-04-09 NOTE — ASSESSMENT
[FreeTextEntry1] : R MARÍA ELENA JULIA.  Xrays and MRI reviewed: MARÍA ELENA DUMONT, PRCT Discussed op versus non op tx, including the r/b/a/c of both. Discussed rehab and recovery after TSA/RSA. She cant take NSAIDs due to Afib.  R GH injection 3/5/24, good relief. Orthovisc was denied. She is in PT, will continue. R shoulder Visco-3 #1. RTO to continue.  Procedure Note: Viscosupplementation Injection: X-ray evidence of Osteoarthritis on this or prior visit and Patient has tried OTC's including aspirin, Ibuprofen, Aleve etc or prescription NSAIDs, and/or exercises at home and/ or physical therapy without satisfactory response.   An injection of Visco-3 was injected into the right shoulder. The risks, benefits, and alternatives to Viscosupplementation injection were explained in full to the patient. Risks outlined include but are not limited to infection, sepsis, bleeding, scarring, skin discoloration, temporary increase in pain, syncopal episode, failure to resolve symptoms, allergic reaction, and symptom recurrence. Signs and symptoms of infection reviewed and patient advised to call immediately for redness, fevers, and/or chills. Patient understood the risks. All questions were answered. After discussion of options, patient requested Viscosupplementation. Oral informed consent was obtained and sterile prep of the injection site was performed using alcohol. Sterile technique was utilized for the procedure including the preparation of the solutions used for the injection. Ethyl chloride spray was used topically.  Sterile technique used. Patient tolerated procedure well. Post Procedure Instructions: Patient was advised to call if redness, pain, or fever occur and apply ice for 15 min. out of every hour for the next 12-24 hours as tolerated. patient was advised to rest the joint(s) for 2 days.  Ultrasound Guidance was used for the following reasons: for Glenohumeral injection.  Ultrasound guided injection was performed of the shoulder, visualization of the needle and placement of injection was performed without complication.

## 2024-04-16 ENCOUNTER — APPOINTMENT (OUTPATIENT)
Dept: ORTHOPEDIC SURGERY | Facility: CLINIC | Age: 74
End: 2024-04-16
Payer: MEDICARE

## 2024-04-16 VITALS — HEIGHT: 64 IN | WEIGHT: 120 LBS | BODY MASS INDEX: 20.49 KG/M2

## 2024-04-16 PROCEDURE — 99212 OFFICE O/P EST SF 10 MIN: CPT | Mod: 25

## 2024-04-16 PROCEDURE — 20611 DRAIN/INJ JOINT/BURSA W/US: CPT | Mod: RT

## 2024-04-16 NOTE — HISTORY OF PRESENT ILLNESS
[de-identified] : 4/16/24: Here for right shoulder visco-3 #2,  4/9/24: Here for R shoulder Visco-3 #1.  3/26/24: Here for follow up. She states injection last visit gave some pain relief. There is less pain with certain motions. She is in PT which helps.  03/05/2024: 74 y/o RHD female presents with chronic right shoulder pain. Describes anterior shoulder pain with limited ROM. Worse with reaching to the back. Wakes from sleep. She has been seen by ortho and treated with PT.  Reports an injection years ago with minimal reilef.  Limited NSAIDs due to afib.  PMHx: HTN, HLD, AFib

## 2024-04-16 NOTE — ASSESSMENT
[FreeTextEntry1] : R MARÍA ELENA JULIA.  Xrays and MRI reviewed: MARÍA ELENA DUMONT, PRCT Discussed op versus non op tx, including the r/b/a/c of both. Discussed rehab and recovery after TSA/RSA. She cant take NSAIDs due to Afib.  R GH injection 3/5/24, good relief. Orthovisc was denied. She is in PT, will continue. R shoulder Visco-3 #2. RTO to continue.  Procedure Note: Viscosupplementation Injection: X-ray evidence of Osteoarthritis on this or prior visit and Patient has tried OTC's including aspirin, Ibuprofen, Aleve etc or prescription NSAIDs, and/or exercises at home and/ or physical therapy without satisfactory response.   An injection of Visco-3 was injected into the right shoulder. The risks, benefits, and alternatives to Viscosupplementation injection were explained in full to the patient. Risks outlined include but are not limited to infection, sepsis, bleeding, scarring, skin discoloration, temporary increase in pain, syncopal episode, failure to resolve symptoms, allergic reaction, and symptom recurrence. Signs and symptoms of infection reviewed and patient advised to call immediately for redness, fevers, and/or chills. Patient understood the risks. All questions were answered. After discussion of options, patient requested Viscosupplementation. Oral informed consent was obtained and sterile prep of the injection site was performed using alcohol. Sterile technique was utilized for the procedure including the preparation of the solutions used for the injection. Ethyl chloride spray was used topically.  Sterile technique used. Patient tolerated procedure well. Post Procedure Instructions: Patient was advised to call if redness, pain, or fever occur and apply ice for 15 min. out of every hour for the next 12-24 hours as tolerated. patient was advised to rest the joint(s) for 2 days.  Ultrasound Guidance was used for the following reasons: for Glenohumeral injection.  Ultrasound guided injection was performed of the shoulder, visualization of the needle and placement of injection was performed without complication.

## 2024-04-23 ENCOUNTER — APPOINTMENT (OUTPATIENT)
Dept: ORTHOPEDIC SURGERY | Facility: CLINIC | Age: 74
End: 2024-04-23

## 2024-04-30 ENCOUNTER — APPOINTMENT (OUTPATIENT)
Dept: ORTHOPEDIC SURGERY | Facility: CLINIC | Age: 74
End: 2024-04-30
Payer: MEDICARE

## 2024-04-30 VITALS — WEIGHT: 120 LBS | HEIGHT: 64 IN | BODY MASS INDEX: 20.49 KG/M2

## 2024-04-30 DIAGNOSIS — M19.011 PRIMARY OSTEOARTHRITIS, RIGHT SHOULDER: ICD-10-CM

## 2024-04-30 PROCEDURE — 20611 DRAIN/INJ JOINT/BURSA W/US: CPT | Mod: RT

## 2024-04-30 PROCEDURE — 99213 OFFICE O/P EST LOW 20 MIN: CPT | Mod: 25

## 2024-04-30 NOTE — HISTORY OF PRESENT ILLNESS
[de-identified] : 4/16/24: Here for right shoulder visco-3 #2,  she continues to have pain.  She is in PT.    4/9/24: Here for R shoulder Visco-3 #1.  3/26/24: Here for follow up. She states injection last visit gave some pain relief. There is less pain with certain motions. She is in PT which helps.  03/05/2024: 74 y/o RHD female presents with chronic right shoulder pain. Describes anterior shoulder pain with limited ROM. Worse with reaching to the back. Wakes from sleep. She has been seen by ortho and treated with PT.  Reports an injection years ago with minimal reilef.  Limited NSAIDs due to afib.  PMHx: HTN, HLD, AFib

## 2024-04-30 NOTE — ASSESSMENT
[FreeTextEntry1] : R MARÍA ELENA UMAD.  Xrays and MRI reviewed: MARÍA ELENA DUMONT, PRCT Discussed op versus non op tx, including the r/b/a/c of both. Discussed rehab and recovery after TSA/RSA. She cant take NSAIDs due to Afib.  R GH injection 3/5/24, good relief. Orthovisc was denied. She is in PT, will continue. R shoulder Visco-3 #3. RTO prn.   Procedure Note: Viscosupplementation Injection: X-ray evidence of Osteoarthritis on this or prior visit and Patient has tried OTC's including aspirin, Ibuprofen, Aleve etc or prescription NSAIDs, and/or exercises at home and/ or physical therapy without satisfactory response.   An injection of Visco-3 was injected into the right shoulder. The risks, benefits, and alternatives to Viscosupplementation injection were explained in full to the patient. Risks outlined include but are not limited to infection, sepsis, bleeding, scarring, skin discoloration, temporary increase in pain, syncopal episode, failure to resolve symptoms, allergic reaction, and symptom recurrence. Signs and symptoms of infection reviewed and patient advised to call immediately for redness, fevers, and/or chills. Patient understood the risks. All questions were answered. After discussion of options, patient requested Viscosupplementation. Oral informed consent was obtained and sterile prep of the injection site was performed using alcohol. Sterile technique was utilized for the procedure including the preparation of the solutions used for the injection. Ethyl chloride spray was used topically.  Sterile technique used. Patient tolerated procedure well. Post Procedure Instructions: Patient was advised to call if redness, pain, or fever occur and apply ice for 15 min. out of every hour for the next 12-24 hours as tolerated. patient was advised to rest the joint(s) for 2 days.  Ultrasound Guidance was used for the following reasons: for Glenohumeral injection.  Ultrasound guided injection was performed of the shoulder, visualization of the needle and placement of injection was performed without complication.

## 2024-06-12 ENCOUNTER — OUTPATIENT (OUTPATIENT)
Dept: OUTPATIENT SERVICES | Facility: HOSPITAL | Age: 74
LOS: 1 days | End: 2024-06-12
Payer: COMMERCIAL

## 2024-06-12 ENCOUNTER — APPOINTMENT (OUTPATIENT)
Age: 74
End: 2024-06-12
Payer: MEDICARE

## 2024-06-12 DIAGNOSIS — Z98.890 OTHER SPECIFIED POSTPROCEDURAL STATES: Chronic | ICD-10-CM

## 2024-06-12 DIAGNOSIS — Z00.8 ENCOUNTER FOR OTHER GENERAL EXAMINATION: ICD-10-CM

## 2024-06-12 DIAGNOSIS — Z98.891 HISTORY OF UTERINE SCAR FROM PREVIOUS SURGERY: Chronic | ICD-10-CM

## 2024-06-12 PROCEDURE — 74261 CT COLONOGRAPHY DX: CPT

## 2024-06-12 PROCEDURE — 74261 CT COLONOGRAPHY DX: CPT | Mod: 26

## 2024-06-22 ENCOUNTER — NON-APPOINTMENT (OUTPATIENT)
Age: 74
End: 2024-06-22

## 2024-08-14 NOTE — PHYSICAL EXAM
Refills sent. Has appt scheduled.    Douglas Nina PA-C     [Right] : right shoulder [5 ___] : forward flexion 5[unfilled]/5 [5___] : external rotation 5[unfilled]/5 [] : no sensory deficits [FreeTextEntry9] : FF R130 L150 ER R40 L40

## 2025-01-03 ENCOUNTER — APPOINTMENT (OUTPATIENT)
Dept: ORTHOPEDIC SURGERY | Facility: CLINIC | Age: 75
End: 2025-01-03
Payer: MEDICARE

## 2025-01-03 DIAGNOSIS — M47.816 SPONDYLOSIS W/OUT MYELOPATHY OR RADICULOPATHY, LUMBAR REGION: ICD-10-CM

## 2025-01-03 DIAGNOSIS — M54.16 RADICULOPATHY, LUMBAR REGION: ICD-10-CM

## 2025-01-03 DIAGNOSIS — M79.10 MYALGIA, UNSPECIFIED SITE: ICD-10-CM

## 2025-01-03 DIAGNOSIS — M79.18 MYALGIA, OTHER SITE: ICD-10-CM

## 2025-01-03 PROCEDURE — J3490M: CUSTOM | Mod: JZ

## 2025-01-03 PROCEDURE — 72170 X-RAY EXAM OF PELVIS: CPT

## 2025-01-03 PROCEDURE — 20552 NJX 1/MLT TRIGGER POINT 1/2: CPT

## 2025-01-03 PROCEDURE — 72100 X-RAY EXAM L-S SPINE 2/3 VWS: CPT

## 2025-01-03 PROCEDURE — 99214 OFFICE O/P EST MOD 30 MIN: CPT | Mod: 25

## 2025-01-03 RX ORDER — TIZANIDINE 4 MG/1
4 TABLET ORAL
Qty: 40 | Refills: 0 | Status: ACTIVE | COMMUNITY
Start: 2025-01-03 | End: 1900-01-01

## 2025-01-14 ENCOUNTER — RESULT REVIEW (OUTPATIENT)
Age: 75
End: 2025-01-14

## 2025-01-17 ENCOUNTER — APPOINTMENT (OUTPATIENT)
Dept: ORTHOPEDIC SURGERY | Facility: CLINIC | Age: 75
End: 2025-01-17
Payer: MEDICARE

## 2025-01-17 DIAGNOSIS — M54.16 RADICULOPATHY, LUMBAR REGION: ICD-10-CM

## 2025-01-17 DIAGNOSIS — M47.816 SPONDYLOSIS W/OUT MYELOPATHY OR RADICULOPATHY, LUMBAR REGION: ICD-10-CM

## 2025-01-17 PROCEDURE — 99214 OFFICE O/P EST MOD 30 MIN: CPT

## 2025-01-28 ENCOUNTER — APPOINTMENT (OUTPATIENT)
Dept: PAIN MANAGEMENT | Facility: CLINIC | Age: 75
End: 2025-01-28
Payer: MEDICARE

## 2025-01-28 VITALS — BODY MASS INDEX: 21.17 KG/M2 | HEIGHT: 64 IN | WEIGHT: 124 LBS

## 2025-01-28 DIAGNOSIS — M47.816 SPONDYLOSIS W/OUT MYELOPATHY OR RADICULOPATHY, LUMBAR REGION: ICD-10-CM

## 2025-01-28 DIAGNOSIS — M54.16 RADICULOPATHY, LUMBAR REGION: ICD-10-CM

## 2025-01-28 PROCEDURE — 99204 OFFICE O/P NEW MOD 45 MIN: CPT

## 2025-02-25 ENCOUNTER — APPOINTMENT (OUTPATIENT)
Dept: PAIN MANAGEMENT | Facility: CLINIC | Age: 75
End: 2025-02-25
Payer: MEDICARE

## 2025-02-25 DIAGNOSIS — M54.16 RADICULOPATHY, LUMBAR REGION: ICD-10-CM

## 2025-02-25 PROCEDURE — 62323 NJX INTERLAMINAR LMBR/SAC: CPT

## 2025-03-18 ENCOUNTER — APPOINTMENT (OUTPATIENT)
Dept: PAIN MANAGEMENT | Facility: CLINIC | Age: 75
End: 2025-03-18
Payer: MEDICARE

## 2025-03-18 VITALS — HEIGHT: 64 IN | WEIGHT: 123 LBS | BODY MASS INDEX: 21 KG/M2

## 2025-03-18 DIAGNOSIS — M47.816 SPONDYLOSIS W/OUT MYELOPATHY OR RADICULOPATHY, LUMBAR REGION: ICD-10-CM

## 2025-03-18 DIAGNOSIS — M54.16 RADICULOPATHY, LUMBAR REGION: ICD-10-CM

## 2025-03-18 PROCEDURE — 99214 OFFICE O/P EST MOD 30 MIN: CPT

## 2025-04-08 ENCOUNTER — APPOINTMENT (OUTPATIENT)
Dept: PAIN MANAGEMENT | Facility: CLINIC | Age: 75
End: 2025-04-08
Payer: MEDICARE

## 2025-04-08 DIAGNOSIS — M54.16 RADICULOPATHY, LUMBAR REGION: ICD-10-CM

## 2025-04-08 PROCEDURE — J3490M: CUSTOM

## 2025-04-08 PROCEDURE — 64483 NJX AA&/STRD TFRM EPI L/S 1: CPT | Mod: RT

## 2025-04-08 PROCEDURE — 64484 NJX AA&/STRD TFRM EPI L/S EA: CPT | Mod: RT,59

## 2025-04-21 ENCOUNTER — APPOINTMENT (OUTPATIENT)
Dept: PAIN MANAGEMENT | Facility: CLINIC | Age: 75
End: 2025-04-21
Payer: MEDICARE

## 2025-04-21 VITALS — BODY MASS INDEX: 20.49 KG/M2 | WEIGHT: 120 LBS | HEIGHT: 64 IN

## 2025-04-21 DIAGNOSIS — M47.816 SPONDYLOSIS W/OUT MYELOPATHY OR RADICULOPATHY, LUMBAR REGION: ICD-10-CM

## 2025-04-21 DIAGNOSIS — M54.16 RADICULOPATHY, LUMBAR REGION: ICD-10-CM

## 2025-04-21 DIAGNOSIS — M79.10 MYALGIA, UNSPECIFIED SITE: ICD-10-CM

## 2025-04-21 PROCEDURE — 99213 OFFICE O/P EST LOW 20 MIN: CPT

## 2025-04-21 RX ORDER — GABAPENTIN 300 MG/1
300 CAPSULE ORAL
Qty: 60 | Refills: 0 | Status: ACTIVE | COMMUNITY
Start: 2025-04-21 | End: 1900-01-01

## 2025-04-22 ENCOUNTER — APPOINTMENT (OUTPATIENT)
Dept: VASCULAR SURGERY | Facility: CLINIC | Age: 75
End: 2025-04-22
Payer: MEDICARE

## 2025-04-22 PROCEDURE — 99203 OFFICE O/P NEW LOW 30 MIN: CPT

## 2025-04-22 PROCEDURE — 93923 UPR/LXTR ART STDY 3+ LVLS: CPT

## 2025-05-14 ENCOUNTER — APPOINTMENT (OUTPATIENT)
Dept: VASCULAR SURGERY | Facility: CLINIC | Age: 75
End: 2025-05-14
Payer: MEDICARE

## 2025-05-14 PROCEDURE — 93970 EXTREMITY STUDY: CPT

## 2025-06-17 ENCOUNTER — RX RENEWAL (OUTPATIENT)
Age: 75
End: 2025-06-17

## 2025-06-25 ENCOUNTER — INPATIENT (INPATIENT)
Facility: HOSPITAL | Age: 75
LOS: 0 days | Discharge: HOME | DRG: 312 | End: 2025-06-26
Attending: INTERNAL MEDICINE | Admitting: INTERNAL MEDICINE
Payer: COMMERCIAL

## 2025-06-25 VITALS
HEART RATE: 113 BPM | HEIGHT: 64 IN | TEMPERATURE: 98 F | SYSTOLIC BLOOD PRESSURE: 92 MMHG | WEIGHT: 123.02 LBS | DIASTOLIC BLOOD PRESSURE: 69 MMHG | OXYGEN SATURATION: 96 % | RESPIRATION RATE: 19 BRPM

## 2025-06-25 DIAGNOSIS — I48.0 PAROXYSMAL ATRIAL FIBRILLATION: ICD-10-CM

## 2025-06-25 DIAGNOSIS — I47.10 SUPRAVENTRICULAR TACHYCARDIA, UNSPECIFIED: ICD-10-CM

## 2025-06-25 DIAGNOSIS — Z98.890 OTHER SPECIFIED POSTPROCEDURAL STATES: Chronic | ICD-10-CM

## 2025-06-25 DIAGNOSIS — I10 ESSENTIAL (PRIMARY) HYPERTENSION: ICD-10-CM

## 2025-06-25 DIAGNOSIS — Z98.891 HISTORY OF UTERINE SCAR FROM PREVIOUS SURGERY: Chronic | ICD-10-CM

## 2025-06-25 DIAGNOSIS — E87.1 HYPO-OSMOLALITY AND HYPONATREMIA: ICD-10-CM

## 2025-06-25 DIAGNOSIS — N17.9 ACUTE KIDNEY FAILURE, UNSPECIFIED: ICD-10-CM

## 2025-06-25 LAB
ADD ON TEST-SPECIMEN IN LAB: SIGNIFICANT CHANGE UP
ALBUMIN SERPL ELPH-MCNC: 4.2 G/DL — SIGNIFICANT CHANGE UP (ref 3.3–5)
ALP SERPL-CCNC: 47 U/L — SIGNIFICANT CHANGE UP (ref 40–120)
ALT FLD-CCNC: 51 U/L — HIGH (ref 10–45)
ANION GAP SERPL CALC-SCNC: 16 MMOL/L — SIGNIFICANT CHANGE UP (ref 5–17)
AST SERPL-CCNC: 46 U/L — HIGH (ref 10–40)
BASOPHILS # BLD AUTO: 0.02 K/UL — SIGNIFICANT CHANGE UP (ref 0–0.2)
BASOPHILS NFR BLD AUTO: 0.2 % — SIGNIFICANT CHANGE UP (ref 0–2)
BILIRUB SERPL-MCNC: 0.8 MG/DL — SIGNIFICANT CHANGE UP (ref 0.2–1.2)
BUN SERPL-MCNC: 24 MG/DL — HIGH (ref 7–23)
CALCIUM SERPL-MCNC: 9.4 MG/DL — SIGNIFICANT CHANGE UP (ref 8.4–10.5)
CHLORIDE SERPL-SCNC: 90 MMOL/L — LOW (ref 96–108)
CO2 SERPL-SCNC: 26 MMOL/L — SIGNIFICANT CHANGE UP (ref 22–31)
CREAT SERPL-MCNC: 1.65 MG/DL — HIGH (ref 0.5–1.3)
EGFR: 32 ML/MIN/1.73M2 — LOW
EGFR: 32 ML/MIN/1.73M2 — LOW
EOSINOPHIL # BLD AUTO: 0 K/UL — SIGNIFICANT CHANGE UP (ref 0–0.5)
EOSINOPHIL NFR BLD AUTO: 0 % — SIGNIFICANT CHANGE UP (ref 0–6)
GAS PNL BLDV: SIGNIFICANT CHANGE UP
GAS PNL BLDV: SIGNIFICANT CHANGE UP
GLUCOSE SERPL-MCNC: 180 MG/DL — HIGH (ref 70–99)
HCT VFR BLD CALC: 46.1 % — HIGH (ref 34.5–45)
HGB BLD-MCNC: 15.6 G/DL — HIGH (ref 11.5–15.5)
IMM GRANULOCYTES # BLD AUTO: 0.07 K/UL — SIGNIFICANT CHANGE UP (ref 0–0.07)
IMM GRANULOCYTES NFR BLD AUTO: 0.6 % — SIGNIFICANT CHANGE UP (ref 0–0.9)
LYMPHOCYTES # BLD AUTO: 1.52 K/UL — SIGNIFICANT CHANGE UP (ref 1–3.3)
LYMPHOCYTES NFR BLD AUTO: 12.7 % — LOW (ref 13–44)
MAGNESIUM SERPL-MCNC: 1.9 MG/DL — SIGNIFICANT CHANGE UP (ref 1.6–2.6)
MCHC RBC-ENTMCNC: 29.3 PG — SIGNIFICANT CHANGE UP (ref 27–34)
MCHC RBC-ENTMCNC: 33.8 G/DL — SIGNIFICANT CHANGE UP (ref 32–36)
MCV RBC AUTO: 86.5 FL — SIGNIFICANT CHANGE UP (ref 80–100)
MONOCYTES # BLD AUTO: 0.77 K/UL — SIGNIFICANT CHANGE UP (ref 0–0.9)
MONOCYTES NFR BLD AUTO: 6.4 % — SIGNIFICANT CHANGE UP (ref 2–14)
NEUTROPHILS # BLD AUTO: 9.58 K/UL — HIGH (ref 1.8–7.4)
NEUTROPHILS NFR BLD AUTO: 80.1 % — HIGH (ref 43–77)
NRBC # BLD AUTO: 0 K/UL — SIGNIFICANT CHANGE UP (ref 0–0)
NRBC # FLD: 0 K/UL — SIGNIFICANT CHANGE UP (ref 0–0)
NRBC BLD AUTO-RTO: 0 /100 WBCS — SIGNIFICANT CHANGE UP (ref 0–0)
NT-PROBNP SERPL-SCNC: 4506 PG/ML — HIGH (ref 0–300)
PHOSPHATE SERPL-MCNC: 3.1 MG/DL — SIGNIFICANT CHANGE UP (ref 2.5–4.5)
PLATELET # BLD AUTO: 216 K/UL — SIGNIFICANT CHANGE UP (ref 150–400)
PMV BLD: 12 FL — SIGNIFICANT CHANGE UP (ref 7–13)
POTASSIUM SERPL-MCNC: 3.5 MMOL/L — SIGNIFICANT CHANGE UP (ref 3.5–5.3)
POTASSIUM SERPL-SCNC: 3.5 MMOL/L — SIGNIFICANT CHANGE UP (ref 3.5–5.3)
PROT SERPL-MCNC: 7.3 G/DL — SIGNIFICANT CHANGE UP (ref 6–8.3)
RBC # BLD: 5.33 M/UL — HIGH (ref 3.8–5.2)
RBC # FLD: 13.2 % — SIGNIFICANT CHANGE UP (ref 10.3–14.5)
SODIUM SERPL-SCNC: 132 MMOL/L — LOW (ref 135–145)
TROPONIN T, HIGH SENSITIVITY RESULT: 21 NG/L — SIGNIFICANT CHANGE UP (ref 0–51)
TROPONIN T, HIGH SENSITIVITY RESULT: 28 NG/L — SIGNIFICANT CHANGE UP (ref 0–51)
WBC # BLD: 11.96 K/UL — HIGH (ref 3.8–10.5)
WBC # FLD AUTO: 11.96 K/UL — HIGH (ref 3.8–10.5)

## 2025-06-25 PROCEDURE — 71045 X-RAY EXAM CHEST 1 VIEW: CPT

## 2025-06-25 PROCEDURE — 84484 ASSAY OF TROPONIN QUANT: CPT

## 2025-06-25 PROCEDURE — 93308 TTE F-UP OR LMTD: CPT | Mod: 26

## 2025-06-25 PROCEDURE — 82803 BLOOD GASES ANY COMBINATION: CPT

## 2025-06-25 PROCEDURE — 84295 ASSAY OF SERUM SODIUM: CPT

## 2025-06-25 PROCEDURE — 84132 ASSAY OF SERUM POTASSIUM: CPT

## 2025-06-25 PROCEDURE — 82435 ASSAY OF BLOOD CHLORIDE: CPT

## 2025-06-25 PROCEDURE — 82330 ASSAY OF CALCIUM: CPT

## 2025-06-25 PROCEDURE — 73502 X-RAY EXAM HIP UNI 2-3 VIEWS: CPT

## 2025-06-25 PROCEDURE — 71045 X-RAY EXAM CHEST 1 VIEW: CPT | Mod: 26

## 2025-06-25 PROCEDURE — 85025 COMPLETE CBC W/AUTO DIFF WBC: CPT

## 2025-06-25 PROCEDURE — 85018 HEMOGLOBIN: CPT

## 2025-06-25 PROCEDURE — 82947 ASSAY GLUCOSE BLOOD QUANT: CPT

## 2025-06-25 PROCEDURE — 83880 ASSAY OF NATRIURETIC PEPTIDE: CPT

## 2025-06-25 PROCEDURE — 85014 HEMATOCRIT: CPT

## 2025-06-25 PROCEDURE — 83735 ASSAY OF MAGNESIUM: CPT

## 2025-06-25 PROCEDURE — 70450 CT HEAD/BRAIN W/O DYE: CPT

## 2025-06-25 PROCEDURE — 93010 ELECTROCARDIOGRAM REPORT: CPT

## 2025-06-25 PROCEDURE — 73502 X-RAY EXAM HIP UNI 2-3 VIEWS: CPT | Mod: 26,RT

## 2025-06-25 PROCEDURE — 70450 CT HEAD/BRAIN W/O DYE: CPT | Mod: 26

## 2025-06-25 PROCEDURE — 80053 COMPREHEN METABOLIC PANEL: CPT

## 2025-06-25 PROCEDURE — 83605 ASSAY OF LACTIC ACID: CPT

## 2025-06-25 PROCEDURE — 99285 EMERGENCY DEPT VISIT HI MDM: CPT

## 2025-06-25 PROCEDURE — 84100 ASSAY OF PHOSPHORUS: CPT

## 2025-06-25 PROCEDURE — 93308 TTE F-UP OR LMTD: CPT

## 2025-06-25 RX ORDER — POTASSIUM CITRATE 5 MEQ/1
0 TABLET, EXTENDED RELEASE ORAL
Refills: 0 | DISCHARGE

## 2025-06-25 RX ORDER — LISINOPRIL 30 MG/1
25 TABLET ORAL DAILY
Refills: 0 | Status: DISCONTINUED | OUTPATIENT
Start: 2025-06-25 | End: 2025-06-26

## 2025-06-25 RX ORDER — ATORVASTATIN CALCIUM 80 MG/1
20 TABLET, FILM COATED ORAL AT BEDTIME
Refills: 0 | Status: DISCONTINUED | OUTPATIENT
Start: 2025-06-25 | End: 2025-06-26

## 2025-06-25 RX ORDER — B1/B2/B3/B5/B6/B12/VIT C/FOLIC 500-0.5 MG
1 TABLET ORAL DAILY
Refills: 0 | Status: DISCONTINUED | OUTPATIENT
Start: 2025-06-25 | End: 2025-06-26

## 2025-06-25 RX ORDER — CYST/ALA/Q10/PHOS.SER/DHA/BROC 100-20-50
1 POWDER (GRAM) ORAL
Refills: 0 | DISCHARGE

## 2025-06-25 RX ORDER — RIVAROXABAN 10 MG/1
15 TABLET, FILM COATED ORAL
Refills: 0 | Status: DISCONTINUED | OUTPATIENT
Start: 2025-06-25 | End: 2025-06-26

## 2025-06-25 RX ORDER — B1/B2/B3/B5/B6/B12/VIT C/FOLIC 500-0.5 MG
1 TABLET ORAL
Refills: 0 | DISCHARGE

## 2025-06-25 RX ADMIN — Medication 1000 MILLILITER(S): at 17:06

## 2025-06-25 RX ADMIN — Medication 40 MILLIEQUIVALENT(S): at 17:58

## 2025-06-25 RX ADMIN — ATORVASTATIN CALCIUM 20 MILLIGRAM(S): 80 TABLET, FILM COATED ORAL at 22:14

## 2025-06-25 NOTE — H&P ADULT - PROBLEM SELECTOR PLAN 2
On AC and BB. Converted to NSR with few VPC's by valsalva .  Continue BB.   Will check TTE and TFT .  EP follow in AM.

## 2025-06-25 NOTE — H&P ADULT - PROBLEM SELECTOR PLAN 4
Trending BMP.  Renal consulted. BP medications with hold parameters. Holding Diuretic and ARB.  S/P IVF 1 L .

## 2025-06-25 NOTE — ED ADULT NURSE NOTE - OBJECTIVE STATEMENT
Pt is 74 y/o female, presenting to the ED c/o palpiations. Pt repotrs syncopal episode @ home, where she felt dizzy prior to episode. Pt reports she was home alone, now having hip pain, unknown head strike on AC. Pt reports went to PCP where she was found in SVT and sent to ED for further eval. Pt brought back to MD JENNIFFER @ bedside, noted to be in SVT 160s. Pt performed vagal maneuver and broke. PMH of HTN and Afib. Upon assessment, pt AxOx3, sitting up in stretcher and speaking in full sentences. Breathing spontaneously and unlabored, >95% RA. Abdomen soft and nontender to palpation. EKG done, given to MD, pt placed on continuous cardiac monitor. Pt moves all extremities w/ = strength. Skin is warm, dry, and intact w/ + peripheral pulses. Pt denies SOB, chest pain, n/v/d, vision changes, chills, and fever. Safety and comfort measures provided- bed in lowest position, locked, and blanket given.

## 2025-06-25 NOTE — ED PROVIDER NOTE - ATTENDING CONTRIBUTION TO CARE
Attending Ankush: I performed a history and physical exam of the patient and discussed their management with the resident/fellow/student. I have reviewed the resident/fellow/student note and agree with the documented findings and plan of care, except as noted. I have personally performed a substantive portion of the visit including all aspects of the medical decision making. My medical decision making and observations are found below. Please refer to any progress notes for updates on clinical course. My notes supersedes the above resident/fellow/student note in case of discrepancy    MDM:  74 y/o F w/ PMH of a paroxysmal a fib on xarelto, HTN, HLD presenting w/ SVT. Seen w/ . Reports earlier this morning had light headed feeling and palpitations. Pt was at home when this happened. Symptoms suddenly worsened and then the next thing pt recalls is waking up on the floor. Unsure if she hit her head. Did land on R side as her hip is hurting her. Went to PCP office who recommended she go to cardiologist office (Dr. De La Cruz). While there was found to be in SVT to 160s and hypotensive so was sent to the ED for further evaluation. Reports last cardiac work up was in 2022. Denies fevers, chills, headache, dizziness, blurred vision, chest pain, cough, shortness of breath, abdominal pain, n/v/d/c, urinary symptoms, MSK pain, rash.     Gen: NAD, AOx3, able to make needs known, non-toxic  Head: NCAT  HEENT: EOMI, oral mucosa moist, normal conjunctiva  Lung: speaking in full sentences, no respiratory distress, CTAB  CV: Tachycardic  Abd: non distended, soft, nontender, no guarding, no CVA tenderness  MSK: no visible deformities  Neuro: Appears non focal  Skin: Warm, well perfused  Psych: normal affect    Pt here w/ syncopal episode this morning, found to be in SVT and hypotensive at cardiology office. In ED in SVT at rates in 160s, but BP WNL. Vagal maneuver performed and pt broke to sinus tachycardia w/ frequent PVCs. POCUS ECHO w/ preserved EF, small IVF. Will provide IVF. Eval for metabolic abnormalities. Monitor on tele. Obtain CTH given fall w/ xarelto use. Obtain hip xray, but low suspicion for fracture as pt has been ambulatory. Sent in w/ note from cards for admission to Dr. Jones, will discuss w/ cards. Plan for labs, imaging, EKG, IVF, meds PRN. Will reassess the need for additional interventions as clinically warranted. Refer to any progress notes for updates on clinical course and as a continuation of this MDM.     I, Dr. Obinna Lechuga, independently interpreted the EKG which showed sinus tachycardia at a rate of 101, Qtc of 479.

## 2025-06-25 NOTE — ED PROVIDER NOTE - PHYSICAL EXAMINATION
GENERAL: Awake, alert, NAD  HEENT: NC/AT, dry mucous membranes, PERRL, EOMI  LUNGS: CTAB, no wheezes or crackles   CARDIAC: SVT which broke with vagal maneuvers  ABDOMEN: Soft, normal BS, non tender, non distended, no rebound, no guarding  EXT: No edema, no calf tenderness, 2+ DP pulses bilaterally, no deformities.  NEURO: A&Ox3. Moving all extremities.  SKIN: Warm and dry. No rash.  PSYCH: Normal affect.

## 2025-06-25 NOTE — H&P ADULT - HISTORY OF PRESENT ILLNESS
75-year-old female patient past medical history of hypertension and paroxysmal A-fib on Xarelto who presents to the emergency department for worsening lightheadedness.  Patient had a syncopal episode today.  Patient does not recall events other than feeling lightheaded prior.  She endorses associated right hip pain after fall.  Unsure of head trauma.  Patient denies chest pain, shortness of breath, vomiting, fevers, abdominal pain, diarrhea.  Patient with poor appetite today.  On exam, patient arrived in SVT otherwise normotensive.  She was able to break from the SVT with vagal maneuvers.  Dr. Artis's saw the patient and recommended her to come in for evaluation and admission.  75-year-old female patient past medical history of hypertension and paroxysmal A-fib on Xarelto who presents to the emergency department for worsening lightheadedness.  Patient had a syncopal episode today.  Patient does not recall events other than feeling lightheaded prior.  She endorses associated right hip pain after fall.  Unsure of head trauma.  Patient denies chest pain, shortness of breath, vomiting, fevers, abdominal pain, diarrhea.  Patient with poor appetite today.  Patient arrived in SVT otherwise normotensive.  She was able to break from the SVT with vagal maneuvers.  Dr. Artis's saw the patient and recommended her to come in for evaluation and admission.

## 2025-06-25 NOTE — H&P ADULT - ASSESSMENT
75-year-old female patient past medical history of hypertension and paroxysmal A-fib on Xarelto who presents to the emergency department for worsening lightheadedness.  Patient had a syncopal episode today.  Patient does not recall events other than feeling lightheaded prior.  She endorses associated right hip pain after fall.  Unsure of head trauma.  Patient denies chest pain, shortness of breath, vomiting, fevers, abdominal pain, diarrhea.  Patient with poor appetite today.  On exam, patient arrived in SVT otherwise normotensive.  She was able to break from the SVT with vagal maneuvers.  Dr. Artis's saw the patient and recommended her to come in for evaluation and admission.

## 2025-06-25 NOTE — ED PROVIDER NOTE - PROGRESS NOTE DETAILS
Vero Mccann MD PGY-3: discussed admission with Dr Last who accepted patient to his service Vero Mccann MD PGY-3: discussed admission with Dr Last who accepted patient to his service    Attending Ankush: lactate improved after IVF. HR remains in SR, now in the 80s. CTH w/ no emergent findings.

## 2025-06-25 NOTE — H&P ADULT - TIME BILLING
Admission H&P including bedside history , examination , reviewing test results and treatment plan. Cardiology ,EP and renal consulted .D/W Patient and ACP.

## 2025-06-25 NOTE — H&P ADULT - PROBLEM SELECTOR PLAN 1
Converted to NSR with few VPC's by valsalva .  Continue BB.   Will check TTE and TFT .  EP follow in AM. Likely secondary to Arrythmia ,Dehydration and possible hypotension .  Will check Orthostasis & TTE .

## 2025-06-25 NOTE — ED ADULT NURSE REASSESSMENT NOTE - NS ED NURSE REASSESS COMMENT FT1
Received report from HOLDEN Chávez in [place]. Pt A&Ox4, denies complaints at this time. Aware of plan of care

## 2025-06-25 NOTE — ED PROVIDER NOTE - CLINICAL SUMMARY MEDICAL DECISION MAKING FREE TEXT BOX
75-year-old female patient past medical history of hypertension and paroxysmal A-fib on Xarelto who presents to the emergency department for worsening lightheadedness.  Patient had a syncopal episode today.  Patient does not recall events other than feeling lightheaded prior.  She endorses associated right hip pain after fall.  Unsure of head trauma.  Patient denies chest pain, shortness of breath, vomiting, fevers, abdominal pain, diarrhea.  Patient with poor appetite today.  On exam, patient arrived in SVT otherwise normotensive.  She was able to break from the SVT with vagal maneuvers.  Dr. Artis's saw the patient and recommended her to come in for evaluation and admission.  On exam, found atraumatic, with clear breath sounds.  Per Dr. Artis's report patient was hypotensive and in SVT at 160 bpm.  Fortunately, patient currently normotensive now sinus rhythm after vagal maneuver.  Plan to evaluate with blood work, EKG, chest x-ray, CT head.  Will evaluate for metabolic etiology versus cardiac etiology.  Will admit to Dr. Jones. 75-year-old female patient past medical history of hypertension and paroxysmal A-fib on Xarelto who presents to the emergency department for worsening lightheadedness.  Patient had a syncopal episode today.  Patient does not recall events other than feeling lightheaded prior.  She endorses associated right hip pain after fall.  Unsure of head trauma.  Patient denies chest pain, shortness of breath, vomiting, fevers, abdominal pain, diarrhea.  Patient with poor appetite today.  On exam, patient arrived in SVT otherwise normotensive.  She was able to break from the SVT with vagal maneuvers.  Dr. Artis's saw the patient and recommended her to come in for evaluation and admission.  On exam, found atraumatic, with clear breath sounds.  Per Dr. Artis's report patient was hypotensive and in SVT at 160 bpm.  Fortunately, patient currently normotensive now sinus rhythm after vagal maneuver.  Plan to evaluate with blood work, EKG, chest x-ray, CT head.  Will evaluate for metabolic etiology versus cardiac etiology.  Will admit to Dr. Jones.    Attending Ankush: See attending attestation.

## 2025-06-26 ENCOUNTER — RESULT REVIEW (OUTPATIENT)
Age: 75
End: 2025-06-26

## 2025-06-26 ENCOUNTER — TRANSCRIPTION ENCOUNTER (OUTPATIENT)
Age: 75
End: 2025-06-26

## 2025-06-26 VITALS
SYSTOLIC BLOOD PRESSURE: 146 MMHG | HEART RATE: 75 BPM | RESPIRATION RATE: 18 BRPM | OXYGEN SATURATION: 97 % | TEMPERATURE: 98 F | DIASTOLIC BLOOD PRESSURE: 87 MMHG

## 2025-06-26 LAB
ANION GAP SERPL CALC-SCNC: 14 MMOL/L — SIGNIFICANT CHANGE UP (ref 5–17)
BUN SERPL-MCNC: 26 MG/DL — HIGH (ref 7–23)
CALCIUM SERPL-MCNC: 8.8 MG/DL — SIGNIFICANT CHANGE UP (ref 8.4–10.5)
CHLORIDE SERPL-SCNC: 98 MMOL/L — SIGNIFICANT CHANGE UP (ref 96–108)
CO2 SERPL-SCNC: 25 MMOL/L — SIGNIFICANT CHANGE UP (ref 22–31)
CREAT SERPL-MCNC: 1.07 MG/DL — SIGNIFICANT CHANGE UP (ref 0.5–1.3)
EGFR: 55 ML/MIN/1.73M2 — LOW
EGFR: 55 ML/MIN/1.73M2 — LOW
GLUCOSE SERPL-MCNC: 94 MG/DL — SIGNIFICANT CHANGE UP (ref 70–99)
HCT VFR BLD CALC: 41.2 % — SIGNIFICANT CHANGE UP (ref 34.5–45)
HGB BLD-MCNC: 14 G/DL — SIGNIFICANT CHANGE UP (ref 11.5–15.5)
MCHC RBC-ENTMCNC: 29.9 PG — SIGNIFICANT CHANGE UP (ref 27–34)
MCHC RBC-ENTMCNC: 34 G/DL — SIGNIFICANT CHANGE UP (ref 32–36)
MCV RBC AUTO: 87.8 FL — SIGNIFICANT CHANGE UP (ref 80–100)
NRBC # BLD AUTO: 0 K/UL — SIGNIFICANT CHANGE UP (ref 0–0)
NRBC # FLD: 0 K/UL — SIGNIFICANT CHANGE UP (ref 0–0)
NRBC BLD AUTO-RTO: 0 /100 WBCS — SIGNIFICANT CHANGE UP (ref 0–0)
PLATELET # BLD AUTO: 188 K/UL — SIGNIFICANT CHANGE UP (ref 150–400)
PMV BLD: 12.8 FL — SIGNIFICANT CHANGE UP (ref 7–13)
POTASSIUM SERPL-MCNC: 3.8 MMOL/L — SIGNIFICANT CHANGE UP (ref 3.5–5.3)
POTASSIUM SERPL-SCNC: 3.8 MMOL/L — SIGNIFICANT CHANGE UP (ref 3.5–5.3)
RBC # BLD: 4.69 M/UL — SIGNIFICANT CHANGE UP (ref 3.8–5.2)
RBC # FLD: 13.2 % — SIGNIFICANT CHANGE UP (ref 10.3–14.5)
SODIUM SERPL-SCNC: 137 MMOL/L — SIGNIFICANT CHANGE UP (ref 135–145)
TSH SERPL-MCNC: 0.42 UIU/ML — SIGNIFICANT CHANGE UP (ref 0.27–4.2)
WBC # BLD: 9.27 K/UL — SIGNIFICANT CHANGE UP (ref 3.8–10.5)
WBC # FLD AUTO: 9.27 K/UL — SIGNIFICANT CHANGE UP (ref 3.8–10.5)

## 2025-06-26 PROCEDURE — 85027 COMPLETE CBC AUTOMATED: CPT

## 2025-06-26 PROCEDURE — 83880 ASSAY OF NATRIURETIC PEPTIDE: CPT

## 2025-06-26 PROCEDURE — 93306 TTE W/DOPPLER COMPLETE: CPT

## 2025-06-26 PROCEDURE — 85014 HEMATOCRIT: CPT

## 2025-06-26 PROCEDURE — 76376 3D RENDER W/INTRP POSTPROCES: CPT

## 2025-06-26 PROCEDURE — 93308 TTE F-UP OR LMTD: CPT

## 2025-06-26 PROCEDURE — 99285 EMERGENCY DEPT VISIT HI MDM: CPT

## 2025-06-26 PROCEDURE — 93306 TTE W/DOPPLER COMPLETE: CPT | Mod: 26

## 2025-06-26 PROCEDURE — 83605 ASSAY OF LACTIC ACID: CPT

## 2025-06-26 PROCEDURE — 80053 COMPREHEN METABOLIC PANEL: CPT

## 2025-06-26 PROCEDURE — 84484 ASSAY OF TROPONIN QUANT: CPT

## 2025-06-26 PROCEDURE — 80048 BASIC METABOLIC PNL TOTAL CA: CPT

## 2025-06-26 PROCEDURE — 85025 COMPLETE CBC W/AUTO DIFF WBC: CPT

## 2025-06-26 PROCEDURE — 82947 ASSAY GLUCOSE BLOOD QUANT: CPT

## 2025-06-26 PROCEDURE — 97161 PT EVAL LOW COMPLEX 20 MIN: CPT

## 2025-06-26 PROCEDURE — 93005 ELECTROCARDIOGRAM TRACING: CPT

## 2025-06-26 PROCEDURE — 84443 ASSAY THYROID STIM HORMONE: CPT

## 2025-06-26 PROCEDURE — 84295 ASSAY OF SERUM SODIUM: CPT

## 2025-06-26 PROCEDURE — 82435 ASSAY OF BLOOD CHLORIDE: CPT

## 2025-06-26 PROCEDURE — 73502 X-RAY EXAM HIP UNI 2-3 VIEWS: CPT

## 2025-06-26 PROCEDURE — 82330 ASSAY OF CALCIUM: CPT

## 2025-06-26 PROCEDURE — 71045 X-RAY EXAM CHEST 1 VIEW: CPT

## 2025-06-26 PROCEDURE — 70450 CT HEAD/BRAIN W/O DYE: CPT

## 2025-06-26 PROCEDURE — 84100 ASSAY OF PHOSPHORUS: CPT

## 2025-06-26 PROCEDURE — 76376 3D RENDER W/INTRP POSTPROCES: CPT | Mod: 26

## 2025-06-26 PROCEDURE — 93356 MYOCRD STRAIN IMG SPCKL TRCK: CPT

## 2025-06-26 PROCEDURE — 83735 ASSAY OF MAGNESIUM: CPT

## 2025-06-26 PROCEDURE — 84132 ASSAY OF SERUM POTASSIUM: CPT

## 2025-06-26 PROCEDURE — 36415 COLL VENOUS BLD VENIPUNCTURE: CPT

## 2025-06-26 PROCEDURE — 85018 HEMOGLOBIN: CPT

## 2025-06-26 PROCEDURE — 82803 BLOOD GASES ANY COMBINATION: CPT

## 2025-06-26 PROCEDURE — 36000 PLACE NEEDLE IN VEIN: CPT

## 2025-06-26 RX ORDER — LISINOPRIL 30 MG/1
1 TABLET ORAL
Refills: 0 | DISCHARGE

## 2025-06-26 RX ORDER — HYDROCHLOROTHIAZIDE 50 MG/1
1 TABLET ORAL
Refills: 0 | DISCHARGE

## 2025-06-26 RX ORDER — POTASSIUM CITRATE 5 MEQ/1
2 TABLET, EXTENDED RELEASE ORAL
Refills: 0 | DISCHARGE

## 2025-06-26 RX ORDER — LISINOPRIL 30 MG/1
1 TABLET ORAL
Qty: 30 | Refills: 0
Start: 2025-06-26 | End: 2025-07-25

## 2025-06-26 RX ORDER — RIVAROXABAN 10 MG/1
20 TABLET, FILM COATED ORAL
Refills: 0 | Status: DISCONTINUED | OUTPATIENT
Start: 2025-06-26 | End: 2025-06-26

## 2025-06-26 RX ORDER — LISINOPRIL 30 MG/1
50 TABLET ORAL DAILY
Refills: 0 | Status: DISCONTINUED | OUTPATIENT
Start: 2025-06-27 | End: 2025-06-26

## 2025-06-26 RX ADMIN — RIVAROXABAN 20 MILLIGRAM(S): 10 TABLET, FILM COATED ORAL at 16:59

## 2025-06-26 RX ADMIN — Medication 1 TABLET(S): at 10:57

## 2025-06-26 RX ADMIN — LISINOPRIL 25 MILLIGRAM(S): 30 TABLET ORAL at 04:57

## 2025-06-26 RX ADMIN — Medication 20 MILLIEQUIVALENT(S): at 10:58

## 2025-06-26 NOTE — DISCHARGE NOTE PROVIDER - NSDCFUADDAPPT_GEN_ALL_CORE_FT
APPTS ARE READY TO BE MADE: [X] YES    Best Family or Patient Contact (if needed):    Additional Information about above appointments (if needed):    1: pcp  2: cardiology  3: EP        Other comments or requests:    APPTS ARE READY TO BE MADE: [X] YES    Best Family or Patient Contact (if needed):    Additional Information about above appointments (if needed):    1: pcp  2: cardiology  3: EP        Other comments or requests:     cardio - Patient informed us they already have secured a follow up appointment which is not visible on [facility] for 7/7/25.

## 2025-06-26 NOTE — PHYSICAL THERAPY INITIAL EVALUATION ADULT - PERTINENT HX OF CURRENT PROBLEM, REHAB EVAL
75-year-old female patient past medical history of hypertension and paroxysmal A-fib on Xarelto who presents to the emergency department for worsening lightheadedness.  Patient had a syncopal episode today.  Patient does not recall events other than feeling lightheaded prior.  She endorses associated right hip pain after fall.  Unsure of head trauma.  Patient denies chest pain, shortness of breath, vomiting, fevers, abdominal pain, diarrhea.  Patient with poor appetite today.  Patient arrived in SVT otherwise normotensive.  She was able to break from the SVT with vagal maneuvers.  Dr. Artis's saw the patient and recommended her to come in for evaluation and admission. Hospital course: (6/25) CT Head: No acute intracranial hemorrhage, mass effect, or midline shift. 6/25 CXR: Clear lungs. (6/25) X-ray R Hip, pelvis: No acute fracture or dislocation. (POCUS ED TTE: Trace pericardial effusion. Preserved EF.  Collapsible IVC measured at 0.5 cm.

## 2025-06-26 NOTE — DISCHARGE NOTE NURSING/CASE MANAGEMENT/SOCIAL WORK - NSDCFUADDAPPT_GEN_ALL_CORE_FT
APPTS ARE READY TO BE MADE: [X] YES    Best Family or Patient Contact (if needed):    Additional Information about above appointments (if needed):    1: pcp  2: cardiology  3: EP        Other comments or requests:

## 2025-06-26 NOTE — DISCHARGE NOTE PROVIDER - NSDCMRMEDTOKEN_GEN_ALL_CORE_FT
atenolol 50 mg oral tablet: 1 tab(s) orally once a day  atorvastatin 20 mg oral tablet: 1 tab(s) orally once a day (at bedtime)  Multiple Vitamins oral tablet: 1 tab(s) orally once a day  Ocuvite Extra oral tablet: 1 tab(s) orally once a day  olmesartan 40 mg oral tablet: 1 tab(s) orally once a day  rivaroxaban 20 mg oral tablet: 1 tab(s) orally once a day (in the evening)

## 2025-06-26 NOTE — DISCHARGE NOTE NURSING/CASE MANAGEMENT/SOCIAL WORK - PATIENT PORTAL LINK FT
You can access the FollowMyHealth Patient Portal offered by [facility] by registering at the following website: [email or web address]. By joining [facility]’s FollowMyHealth portal, you will also be able to view your health information using other applications (apps) compatible with our system.

## 2025-06-26 NOTE — CONSULT NOTE ADULT - ASSESSMENT
Patient seen and examined, agree with above assessment and plan as transcribed above.    - D/W EP will plan for oupt pt ablation  - Scheduled to see Sr. Messina on monday  - Hold antihypertensives  - Increase Atenolol    Devaughn Jones MD, [facility]  BEEPER [phone]

## 2025-06-26 NOTE — PROGRESS NOTE ADULT - ASSESSMENT
75-year-old female patient past medical history of hypertension and paroxysmal A-fib on Xarelto who presents to the emergency department for worsening lightheadedness.  Patient had a syncopal episode today.  Patient does not recall events other than feeling lightheaded prior.  She endorses associated right hip pain after fall.  Unsure of head trauma.  Patient denies chest pain, shortness of breath, vomiting, fevers, abdominal pain, diarrhea.  Patient with poor appetite today.  On exam, patient arrived in SVT otherwise normotensive.  She was able to break from the SVT with vagal maneuvers.  Dr. Artis's saw the patient and recommended her to come in for evaluation and admission.        Problem/Plan - 1:  ·  Problem: Syncope.  ·  Plan: Likely secondary to Arrythmia ,Dehydration and possible hypotension .  No Orthostasis & TTE  Pending .     Problem/Plan - 2:  ·  Problem: Paroxysmal SVT (supraventricular tachycardia).  ·  Plan: Converted to NSR with few VPC's by valsalva .  Continue BB.   Will check TTE and TFT .  EP following.     Problem/Plan - 3:  ·  Problem: PAF (paroxysmal atrial fibrillation).  ·  Plan: On AC and BB.     Problem/Plan - 4:  ·  Problem: NOY (acute kidney injury).  ·  Plan: Holding Diuretic and ARB.  S/P IVF 1 L .     Problem/Plan - 5:  ·  Problem: HTN (hypertension).  ·  Plan: BP medications with hold parameters.     Problem/Plan - 6:  ·  Problem: Hyponatremia.   ·  Plan: Trending Na .    Normalized.

## 2025-06-26 NOTE — PHYSICAL THERAPY INITIAL EVALUATION ADULT - ADDITIONAL COMMENTS
Patient lives with  in a private house with no JEFFERSON from side door and 3-4 steps to kitchen, 2+4+6 steps to reach bedroom with intermittent landing; has one side handrail all over. PTA, pt. was independent in ambulation & mobility w/o AD; denies any h/o fall 2/2 LOB

## 2025-06-26 NOTE — DISCHARGE NOTE NURSING/CASE MANAGEMENT/SOCIAL WORK - FINANCIAL ASSISTANCE
[facility] provides services at a reduced cost to those who are determined to be eligible through [facility]’s financial assistance program. Information regarding [facility]’s financial assistance program can be found by going to [email or web address] or by calling [phone].

## 2025-06-26 NOTE — DISCHARGE NOTE PROVIDER - NSDCCPCAREPLAN_GEN_ALL_CORE_FT
PRINCIPAL DISCHARGE DIAGNOSIS  Diagnosis: Syncope  Assessment and Plan of Treatment: Take meds as prescribed above with adjustments        SECONDARY DISCHARGE DIAGNOSES  Diagnosis: SVT (supraventricular tachycardia)  Assessment and Plan of Treatment: increase beta blockers as above medication states   plan to follow up in office to discuss elective ablation as outpatient    Diagnosis: PAF (paroxysmal atrial fibrillation)  Assessment and Plan of Treatment: plan as above    Diagnosis: Stage 3 chronic kidney disease  Assessment and Plan of Treatment: follow up with pcp  stop HCTZ as on med list above    Diagnosis: HTN (hypertension)  Assessment and Plan of Treatment: take meds as prescribed    Diagnosis: Hyponatremia  Assessment and Plan of Treatment: Last sodium 132, follow up with pcp and renal    Diagnosis: NOY (acute kidney injury)  Assessment and Plan of Treatment: medications adjusted as above, take as prescribed and follow up with cp

## 2025-06-26 NOTE — CONSULT NOTE ADULT - SUBJECTIVE AND OBJECTIVE BOX
HPI: Ms. Benitez is a 75 year-old woman with history of multiple medical issues including hypertension, atrial fibrillation, and osteopenia, who presented yesterday to the [facility] ER with lightheadedness for 1 day, culminating in an episode of syncope. She endorsed right hip pain associated with her fall. She visited her cardiologist Dr. Lesly De La Cruz after the fall and was noted to be in SVT and advised she go to the [facility] ER. Ms. Benitez's creatinine was 1.65mg/dL in the ER; in light of the azotemia, a Nephrology consultation was requested. I see that her BP was 92/69, and that she received a 1L NS IV bolus in the ER. Whereas her pulse was 110s-160s upon presentation, it is now in the 80s, and BP is up to 140s/100s. I see that her creatinine today is down to 1.07mg/dL.      PAST MEDICAL & SURGICAL HISTORY:  HTN  AFib - Xarelto  Osteopenia   x 2  Umbilical hernia repair  B/L knee arthroscopy    Allergies/Intolerances:  amidoamine (additive in soaps/shampoos) (Rash)  latex (Rash)  erythromycin (Stomach Upset)    SOCIAL HISTORY:  Denies ETOh,Smoking,     FAMILY HISTORY:  Family history of atrial fibrillation (Father, Mother)  Family history of stroke (Mother)  Family history of breast cancer (Sibling)  Hypertension (Sibling)    REVIEW OF SYSTEMS:  CONSTITUTIONAL: No weakness, fevers or chills  EYES/ENT: No visual changes;  No vertigo or throat pain   NECK: No pain or stiffness  RESPIRATORY: No cough, wheezing, hemoptysis; No shortness of breath  CARDIOVASCULAR: No chest pain or palpitations; (+)syncope  GASTROINTESTINAL: No abdominal or epigastric pain. No nausea, vomiting, or hematemesis; No diarrhea or constipation. No melena or hematochezia.  GENITOURINARY: No dysuria, frequency or hematuria  NEUROLOGICAL: No numbness or weakness  SKIN: No itching, burning, rashes, or lesions   All other review of systems is negative unless indicated above.    VITAL:  T(C): , Max: 37.5 (25 @ 22:33)  T(F): , Max: 99.5 (25 @ 22:33)  HR: 86 (25 @ 05:50)  BP: 140/102 (25 @ 05:50)  BP(mean): 96 (25 @ 21:01)  RR: 18 (25 @ 04:33)  SpO2: 94% (25 @ 04:33)    PHYSICAL EXAM:  Constitutional: NAD, Alert  HEENT: NCAT, MMM  Neck: Supple, No JVD  Respiratory: CTA-b/l  Cardiovascular: RRR s1s2, no m/r/g  Gastrointestinal: BS+, soft, NT/ND  Extremities: No peripheral edema b/l  Neurological: no focal deficits; strength grossly intact  Back: no CVAT b/l  Skin: No rashes, no nevi    LABS:                        14.0   9.27  )-----------( 188      ( 2025 07:04 )             41.2     Na(137)/K(3.8)/Cl(98)/HCO3(25)/BUN(26)/Cr(1.07)Glu(94)/Ca(8.8)/Mg(--)/PO4(--)     @ 07:04  Na(132)/K(3.5)/Cl(90)/HCO3(26)/BUN(24)/Cr(1.65)Glu(180)/Ca(9.4)/Mg(1.9)/PO4(3.1)     @ 17:05        IMAGING:  < from: Xray Hip w/ Pelvis 2 or 3 Views, Right (25 @ 18:22) >  No acute fracture or dislocation.    < from: CT Head No Cont (25 @ 17:54) >  No acute intracranial hemorrhage, mass effect, or midline shift.        ASSESSMENT:  (1)NOY - prerenally mediated - improved with IVF/with reversion to sinus rhythm  (2)Hypokalemia - improved s/p repletion  (3)CV - tenuous hemodynamics upon admission - now improved - we can adding back antihypertensives...I would forgo the HCTZ however  (4)EP - syncope - reverted to SR      RECOMMEND:  (1)Forgo HCTZ; can add back the other antihypertensives  (2)No further workup needed for CKD/NOY for now  (3)Dose new meds for GFR 60ml/min    Thank you for involving [facility] in this patient's care.    With warm regards,    Judah Blanchard MD   [facility]  Office: [phone]  Cell: [phone]                 HPI: Ms. Benitez is a 75 year-old woman with history of multiple medical issues including hypertension, atrial fibrillation, and osteopenia, who presented yesterday to the [facility] ER with lightheadedness for 1 day, culminating in an episode of syncope. She endorsed right hip pain associated with her fall. She visited her cardiologist Dr. Lesly De La Cruz after the fall and was noted to be in SVT and advised she go to the [facility] ER. Ms. Benitez's creatinine was 1.65mg/dL in the ER; in light of the azotemia, a Nephrology consultation was requested. I see that her BP was 92/69, and that she received a 1L NS IV bolus in the ER. Whereas her pulse was 110s-160s upon presentation, it is now in the 80s, and BP is up to 140s/100s. I see that her creatinine today is down to 1.07mg/dL.    Ms. Benitez denies known past history of CKD/NOY. She denies notable urinary changes. She feels well at present; she does attest to a pruritic rash of her arms.  PAST MEDICAL & SURGICAL HISTORY:  HTN  AFib - Xarelto  Osteopenia   x 2  Umbilical hernia repair  B/L knee arthroscopy    Allergies/Intolerances:  amidoamine (additive in soaps/shampoos) (Rash)  latex (Rash)  erythromycin (Stomach Upset)    SOCIAL HISTORY:  Denies ETOh,Smoking,     FAMILY HISTORY:  Family history of atrial fibrillation (Father, Mother)  Family history of stroke (Mother)  Family history of breast cancer (Sibling)  Hypertension (Sibling)    REVIEW OF SYSTEMS:  CONSTITUTIONAL: No weakness, fevers or chills  EYES/ENT: No visual changes;  No vertigo or throat pain   NECK: No pain or stiffness  RESPIRATORY: No cough, wheezing, hemoptysis; No shortness of breath  CARDIOVASCULAR: No chest pain or palpitations; (+)syncope  GASTROINTESTINAL: No abdominal or epigastric pain. No nausea, vomiting, or hematemesis; No diarrhea or constipation. No melena or hematochezia.  GENITOURINARY: No dysuria, frequency or hematuria  NEUROLOGICAL: No numbness or weakness  SKIN: No itching, burning, rashes, or lesions   All other review of systems is negative unless indicated above.    VITAL:  T(C): , Max: 37.5 (25 @ 22:33)  T(F): , Max: 99.5 (25 @ 22:33)  HR: 86 (25 @ 05:50)  BP: 140/102 (25 @ 05:50)  BP(mean): 96 (25 @ 21:01)  RR: 18 (25 @ 04:33)  SpO2: 94% (25 @ 04:33)    PHYSICAL EXAM:  Constitutional: NAD, Alert  HEENT: NCAT, MMM  Neck: Supple, No JVD  Respiratory: CTA-b/l  Cardiovascular: RRR s1s2, no m/r/g  Gastrointestinal: BS+, soft, NT/ND  Extremities: No peripheral edema b/l  Neurological: no focal deficits; strength grossly intact  Back: no CVAT b/l  Skin: (+)b/l UE macular rash    LABS:                        14.0   9.27  )-----------( 188      ( 2025 07:04 )             41.2     Na(137)/K(3.8)/Cl(98)/HCO3(25)/BUN(26)/Cr(1.07)Glu(94)/Ca(8.8)/Mg(--)/PO4(--)     @ 07:04  Na(132)/K(3.5)/Cl(90)/HCO3(26)/BUN(24)/Cr(1.65)Glu(180)/Ca(9.4)/Mg(1.9)/PO4(3.1)     @ 17:05        IMAGING:  < from: Xray Hip w/ Pelvis 2 or 3 Views, Right (25 @ 18:22) >  No acute fracture or dislocation.    < from: CT Head No Cont (25 @ 17:54) >  No acute intracranial hemorrhage, mass effect, or midline shift.        ASSESSMENT:  (1)NOY - prerenally mediated - improved with IVF/with reversion to sinus rhythm  (2)Hypokalemia - improved s/p repletion  (3)CV - tenuous hemodynamics upon admission - now improved - we can adding back antihypertensives...I would forgo the HCTZ however  (4)EP - syncope - reverted to SR  (5)Derm - UE rash    RECOMMEND:  (1)Forgo HCTZ; can add back the other antihypertensives  (2)No further workup needed for CKD/NOY for now  (3)Workup of UE rash per primary team  (4)Dose new meds for GFR 60ml/min  (5)No Nephrology objection to discharge; no need to set up outpatient followup (can f/u on a prn basis)    Thank you for involving [facility] in this patient's care.    With warm regards,    Judah Blanchard MD   [facility]  Office: [phone]  Cell: [phone]

## 2025-06-26 NOTE — DISCHARGE NOTE PROVIDER - PROVIDER TOKENS
PROVIDER:[TOKEN:[[id number]],FOLLOWUP:[1-3 days],ESTABLISHEDPATIENT:[T]],PROVIDER:[TOKEN:[[id number]],FOLLOWUP:[1 week],ESTABLISHEDPATIENT:[T]]

## 2025-06-26 NOTE — PROGRESS NOTE ADULT - SUBJECTIVE AND OBJECTIVE BOX
Date of Service  : 06-26-25     INTERVAL HPI/OVERNIGHT EVENTS: I feel fine.  Vital Signs Last 24 Hrs  T(C): 36.5 (26 Jun 2025 10:00), Max: 37.5 (25 Jun 2025 22:33)  T(F): 97.7 (26 Jun 2025 10:00), Max: 99.5 (25 Jun 2025 22:33)  HR: 67 (26 Jun 2025 10:00) (67 - 167)  BP: 136/82 (26 Jun 2025 10:00) (92/69 - 150/102)  BP(mean): 96 (25 Jun 2025 21:01) (81 - 105)  RR: 18 (26 Jun 2025 10:00) (16 - 20)  SpO2: 97% (26 Jun 2025 10:00) (94% - 98%)    Parameters below as of 26 Jun 2025 10:00  Patient On (Oxygen Delivery Method): room air      I&O's Summary    MEDICATIONS  (STANDING):  atenolol  Tablet 25 milliGRAM(s) Oral daily  atorvastatin 20 milliGRAM(s) Oral at bedtime  multivitamin 1 Tablet(s) Oral daily  rivaroxaban 15 milliGRAM(s) Oral with dinner    MEDICATIONS  (PRN):    LABS:                        14.0   9.27  )-----------( 188      ( 26 Jun 2025 07:04 )             41.2     06-26    137  |  98  |  26[H]  ----------------------------<  94  3.8   |  25  |  1.07    Ca    8.8      26 Jun 2025 07:04  Phos  3.1     06-25  Mg     1.9     06-25    TPro  7.3  /  Alb  4.2  /  TBili  0.8  /  DBili  x   /  AST  46[H]  /  ALT  51[H]  /  AlkPhos  47  06-25      Urinalysis Basic - ( 26 Jun 2025 07:04 )    Color: x / Appearance: x / SG: x / pH: x  Gluc: 94 mg/dL / Ketone: x  / Bili: x / Urobili: x   Blood: x / Protein: x / Nitrite: x   Leuk Esterase: x / RBC: x / WBC x   Sq Epi: x / Non Sq Epi: x / Bacteria: x      CAPILLARY BLOOD GLUCOSE            Urinalysis Basic - ( 26 Jun 2025 07:04 )    Color: x / Appearance: x / SG: x / pH: x  Gluc: 94 mg/dL / Ketone: x  / Bili: x / Urobili: x   Blood: x / Protein: x / Nitrite: x   Leuk Esterase: x / RBC: x / WBC x   Sq Epi: x / Non Sq Epi: x / Bacteria: x      REVIEW OF SYSTEMS:  CONSTITUTIONAL: No fever, weight loss, or fatigue  EYES: No eye pain, visual disturbances, or discharge  ENMT:  No difficulty hearing, tinnitus, vertigo; No sinus or throat pain  NECK: No pain or stiffness  RESPIRATORY: No cough, wheezing, chills or hemoptysis; No shortness of breath  CARDIOVASCULAR: No chest pain, palpitations, dizziness, or leg swelling  GASTROINTESTINAL: No abdominal or epigastric pain. No nausea, vomiting, or hematemesis; No diarrhea or constipation. No melena or hematochezia.  GENITOURINARY: No dysuria, frequency, hematuria, or incontinence  NEUROLOGICAL: No headaches, memory loss, loss of strength, numbness, or tremors      RADIOLOGY & ADDITIONAL TESTS:    Consultant(s) Notes Reviewed:  [x ] YES  [ ] NO    PHYSICAL EXAM:  GENERAL: NAD, well-groomed, well-developed,not in any distress ,  HEAD:  Atraumatic, Normocephalic  EYES: EOMI, PERRLA, conjunctiva and sclera clear  ENMT: No tonsillar erythema, exudates, or enlargement; Moist mucous membranes, Good dentition, No lesions  NECK: Supple, No JVD, Normal thyroid  NERVOUS SYSTEM:  Alert & Oriented X3, No focal deficit   CHEST/LUNG: Good air entry bilateral with no  rales, rhonchi, wheezing, or rubs  HEART: Regular rate and rhythm; No murmurs, rubs, or gallops  ABDOMEN: Soft, Nontender, Nondistended; Bowel sounds present  EXTREMITIES:  2+ Peripheral Pulses, No clubbing, cyanosis, or edema      Care Discussed with Consultants/Other Providers [ x] YES  [ ] NO

## 2025-06-26 NOTE — CONSULT NOTE ADULT - SUBJECTIVE AND OBJECTIVE BOX
C A R D I O L O G Y  *********************    DATE OF SERVICE: 25    HISTORY OF PRESENT ILLNESS: HPI:  Patient is a 75-year-old female with PMH of hypertension and paroxysmal A-fib on Xarelto who is admitted with syncope and SVT. Patient reports feeling lightheaded at home and then passing out in kitchen. Came to see cardiologist and was found to have SVT sent to ED. SVT broke with vagal maneuvers. Palpitations and Dizziness improved. Denies chest pain or SOB.    PAST MEDICAL & SURGICAL HISTORY:  Atrial fibrillation  on xarelto      Hypertension      Eczema      Osteopenia      History of  section  x2      H/O umbilical hernia repair      S/P left knee arthroscopy      S/P right knee arthroscopy              MEDICATIONS:  MEDICATIONS  (STANDING):  atorvastatin 20 milliGRAM(s) Oral at bedtime  multivitamin 1 Tablet(s) Oral daily  rivaroxaban 20 milliGRAM(s) Oral with dinner      Allergies    No Known Drug Allergies  amidoamine (additive in soaps/shampoos) (Rash)  latex (Rash)    Intolerances    erythromycin (Stomach Upset)      FAMILY HISTORY:  Family history of atrial fibrillation (Father, Mother)    Family history of stroke (Mother)    Family history of breast cancer (Sibling)    Hypertension (Sibling)      Non-contributary for premature coronary disease or sudden cardiac death    SOCIAL HISTORY:    [x ] Non-smoker  [ ] Smoker  [ ] Alcohol    FLU VACCINE THIS YEAR STARTS IN AUGUST:  [ ] Yes    [ ] No    IF OVER 65 HAVE YOU EVER HAD A PNA VACCINE:  [ ] Yes    [ ] No       [ ] N/A      REVIEW OF SYSTEMS:  [ ]chest pain  [  ]shortness of breath  [ x ]palpitations  [ x ]syncope  [ ]near syncope [ ]upper extremity weakness   [ ] lower extremity weakness  [  ]diplopia  [  ]altered mental status   [  ]fevers  [ ]chills [ ]nausea  [ ]vomiting  [  ]dysphagia    [ ]abdominal pain  [ ]melena  [ ]BRBPR    [  ]epistaxis  [  ]rash    [ ]lower extremity edema        [X] All others negative	  [ ] Unable to obtain      LABS:	 	    CARDIAC MARKERS:                              14.0   9.27  )-----------( 188      ( 2025 07:04 )             41.2     Hb Trend: 14.0<--, 15.6<--    -26    137  |  98  |  26[H]  ----------------------------<  94  3.8   |  25  |  1.07    Ca    8.8      2025 07:04  Phos  3.1       Mg     1.9         TPro  7.3  /  Alb  4.2  /  TBili  0.8  /  DBili  x   /  AST  46[H]  /  ALT  51[H]  /  AlkPhos  47      Creatinine Trend: 1.07<--, 1.65<--    Coags:      proBNP:   Lipid Profile:   HgA1c:   TSH: Thyroid Stimulating Hormone, Serum: 0.42 uIU/mL ( @ 07:04)  Thyroid Stimulating Hormone, Serum: 1.73 uIU/mL ( @ 19:29)          PHYSICAL EXAM:  T(C): 36.4 (25 @ 13:15), Max: 37.5 (25 @ 22:33)  HR: 65 (25 @ 13:15) (65 - 167)  BP: 134/80 (25 @ 13:15) (92/69 - 150/102)  RR: 18 (25 @ 13:15) (16 - 20)  SpO2: 98% (25 @ 13:15) (94% - 98%)  Wt(kg): --   BMI (kg/m2): 21.1 (25 @ 16:37)  I&O's Summary      Gen: NAD  HEENT:  (-)icterus (-)pallor  CV: N S1 S2 1/6 EDIE (+)2 Pulses B/l  Resp:  Clear to auscultation B/L, normal effort  GI: (+) BS Soft, NT, ND  Lymph:  (-)Edema, (-)obvious lymphadenopathy  Skin: Warm to touch, Normal turgor  Psych: Appropriate mood and affect      TELEMETRY: SVT -> NSR	      ECG: Sinus tachycardia, PVC	    RADIOLOGY:         CXR: < from: Xray Chest 1 View- PORTABLE-Urgent (25 @ 17:54) >  IMPRESSION:  Clear lungs.    < end of copied text >    < from: TTE W or WO Ultrasound Enhancing Agent (25 @ 08:10) >  CONCLUSIONS:      1. Left ventricular cavity issmall. Left ventricular wall thickness is normal. Left ventricular systolic function is normal with an ejection fraction visually estimated at 60 to 65 %. There are no regional wall motion abnormalities seen.   2. Left ventricular global longitudinalstrain is -19.3 % which is normal (< -18%). Images were acquired on a [employer] ultrasound system and processed using [employer] strain analysis software with a heart rate of 60 bpm and a blood pressure of 140/102 mmHg.   3. No pericardial effusion seen.   4. No prior echocardiogram is available for comparison.    < end of copied text >      ASSESSMENT/PLAN: Patient is a 75-year-old female with PMH of hypertension and paroxysmal A-fib on Xarelto who is admitted with syncope and SVT.     #Syncope  - Suspect hemodynamic mediated, Found to have NOY improved s/p IVF  - Negative orthostatics however s/p IVF  - TTE with normal LV function, no pericardial effusion or sig valvular disease    #SVT  - Broke with vagal maneuver  - TSH WNL  - Increase Atenolol to 50mg daily  - D/W outpatient EP Dr. Messina - increase beta blockers and plan to f/u in office to discuss elective ablation as outpatient    #PAF  - Continue Xarelto for stroke prevention    #HTN  - Stop HCTZ given NOY per renal  - d/w renal Ok to restart home Olmesartan   - Continue Atenolol increased to 50mg daily    - No further inpatient cardiac w/u planned - stable for discharge from CV perspective  - Patient to f/u with Dr. De La Cruz and Siddharth after discharge    Alexis Rios PA-C  Pager: [phone]

## 2025-06-26 NOTE — DISCHARGE NOTE PROVIDER - CARE PROVIDER_API CALL
Lesly De La Cruz  Cardiovascular Disease  [address] [facility] [address]  [place] [address]  Phone: [phone]  Fax: [phone]  Established Patient  Follow Up Time: 1-3 days    Rick Messina  Cardiovascular Disease  [address] [facility] [facility] [address]  [place] [address]  Phone: [phone]  Fax: [phone]  Established Patient  Follow Up Time: 1 week

## 2025-06-26 NOTE — DISCHARGE NOTE PROVIDER - HOSPITAL COURSE
HPI:  75-year-old female patient past medical history of hypertension and paroxysmal A-fib on Xarelto who presents to the emergency department for worsening lightheadedness.  Patient had a syncopal episode today.  Patient does not recall events other than feeling lightheaded prior.  She endorses associated right hip pain after fall.  Unsure of head trauma.  Patient denies chest pain, shortness of breath, vomiting, fevers, abdominal pain, diarrhea.  Patient with poor appetite today.  Patient arrived in SVT otherwise normotensive.  She was able to break from the SVT with vagal maneuvers.  Dr. Artis's saw the patient and recommended her to come in for evaluation and admission.  (25 Jun 2025 21:01)    Hospital Course:  admitted with syncope and SVT.     #Syncope  - Suspect hemodynamic mediated, Found to have NOY improved s/p IVF  - Negative orthostatics however s/p IVF  - TTE with normal LV function, no pericardial effusion or sig valvular disease    #SVT  - Broke with vagal maneuver  - TSH WNL  - Increase Atenolol to 50mg daily  - D/W outpatient EP Dr. Messina - increase beta blockers and plan to f/u in office to discuss elective ablation as outpatient    #PAF  - Continue Xarelto for stroke prevention    #HTN  - Stop HCTZ given NOY per renal  - d/w renal Ok to restart home Olmesartan   - Continue Atenolol increased to 50mg daily    - No further inpatient cardiac w/u planned - stable for discharge from CV perspective  - Patient to f/u with Dr. De La Cruz and Siddharth after discharge  cleared for dc home with above recs    Important Medication Changes and Reason:  - Stop HCTZ given NOY per renal  - d/w renal Ok to restart home Olmesartan   - Continue Atenolol increased to 50mg daily  Active or Pending Issues Requiring Follow-up:  cardiology EP PCP      Advanced Directives:   [ x] Full code  [ ] DNR  [ ] Hospice    Discharge Diagnoses:  Syncope  SVT  HTN  NOY  PAF  CKD3

## 2025-07-09 ENCOUNTER — APPOINTMENT (OUTPATIENT)
Dept: ORTHOPEDIC SURGERY | Facility: CLINIC | Age: 75
End: 2025-07-09
Payer: MEDICARE

## 2025-07-09 PROCEDURE — 99214 OFFICE O/P EST MOD 30 MIN: CPT

## 2025-07-09 PROCEDURE — 72170 X-RAY EXAM OF PELVIS: CPT

## 2025-07-09 PROCEDURE — 72100 X-RAY EXAM L-S SPINE 2/3 VWS: CPT

## 2025-07-14 ENCOUNTER — DOCTOR'S OFFICE (OUTPATIENT)
Facility: LOCATION | Age: 75
Setting detail: OPHTHALMOLOGY
End: 2025-07-14
Payer: MEDICARE

## 2025-07-14 ENCOUNTER — RX ONLY (RX ONLY)
Age: 75
End: 2025-07-14

## 2025-07-14 DIAGNOSIS — H26.491: ICD-10-CM

## 2025-07-14 DIAGNOSIS — H35.3111: ICD-10-CM

## 2025-07-14 DIAGNOSIS — H25.12: ICD-10-CM

## 2025-07-14 DIAGNOSIS — H11.153: ICD-10-CM

## 2025-07-14 PROCEDURE — 92014 COMPRE OPH EXAM EST PT 1/>: CPT | Performed by: OPHTHALMOLOGY

## 2025-07-14 PROCEDURE — 92250 FUNDUS PHOTOGRAPHY W/I&R: CPT | Performed by: OPHTHALMOLOGY

## 2025-07-14 ASSESSMENT — REFRACTION_MANIFEST
OD_VA1: 20/40+
OD_VA1: 20/25
OS_AXIS: 107
OS_VA1: 20/40+3
OD_SPHERE: +0.50
OD_AXIS: 050
OD_VA1: 20/30
OD_AXIS: 070
OS_SPHERE: +0.75
OD_AXIS: 010
OD_CYLINDER: +1.25
OD_SPHERE: +1.25
OS_SPHERE: +1.50
OS_AXIS: 100
OD_CYLINDER: -0.75
OS_SPHERE: +1.50
OD_CYLINDER: -0.50
OS_AXIS: 095
OD_SPHERE: +1.00
OD_CYLINDER: -0.50
OS_CYLINDER: -1.00
OS_VA1: 20/40+
OS_CYLINDER: -0.75
OS_AXIS: 010
OD_SPHERE: +0.75
OS_AXIS: 100
OD_VA1: 20/30-2
OD_CYLINDER: -0.50
OS_AXIS: 100
OS_SPHERE: +1.75
OS_SPHERE: +1.50
OD_VA1: 20/30-2
OD_AXIS: 055
OS_CYLINDER: -0.75
OD_AXIS: 015
OS_CYLINDER: -0.75
OD_CYLINDER: -0.75
OS_VA1: 20/20
OD_SPHERE: -0.25
OS_CYLINDER: +1.00
OD_SPHERE: +1.25
OS_VA1: 20/40
OD_CYLINDER: -0.25
OD_AXIS: 085
OS_CYLINDER: -1.25
OD_AXIS: 095
OD_VA1: 20/25
OD_SPHERE: +1.50
OD_SPHERE: PLANO
OS_SPHERE: +1.25
OD_VA1: 20/40+3

## 2025-07-14 ASSESSMENT — REFRACTION_CURRENTRX
OS_CYLINDER: -0.25
OS_AXIS: 091
OD_AXIS: 094
OS_VPRISM_DIRECTION: SV
OD_OVR_VA: 20/
OD_VPRISM_DIRECTION: SV
OD_CYLINDER: -0.25
OS_SPHERE: +2.50
OS_OVR_VA: 20/
OD_SPHERE: +2.75

## 2025-07-14 ASSESSMENT — REFRACTION_AUTOREFRACTION
OS_AXIS: 106
OD_AXIS: 056
OD_SPHERE: -0.25
OS_CYLINDER: -1.00
OS_SPHERE: +2.00
OD_CYLINDER: -0.25

## 2025-07-14 ASSESSMENT — TONOMETRY
OS_IOP_MMHG: 13
OD_IOP_MMHG: 12

## 2025-07-14 ASSESSMENT — VISUAL ACUITY
OS_BCVA: 20/20
OD_BCVA: 20/25-

## 2025-07-14 ASSESSMENT — CONFRONTATIONAL VISUAL FIELD TEST (CVF)
OS_FINDINGS: FULL
OD_FINDINGS: FULL

## 2025-07-14 ASSESSMENT — KERATOMETRY
METHOD_AUTO_MANUAL: AUTO
OD_K1POWER_DIOPTERS: 44.50
OD_AXISANGLE_DEGREES: 99
OS_K2POWER_DIOPTERS: 44.75
OD_K2POWER_DIOPTERS: 45.00
OS_AXISANGLE_DEGREES: 078
OS_K1POWER_DIOPTERS: 44.50

## 2025-07-28 ENCOUNTER — APPOINTMENT (OUTPATIENT)
Dept: CV DIAGNOSITCS | Facility: HOSPITAL | Age: 75
End: 2025-07-28

## 2025-07-28 ENCOUNTER — RESULT REVIEW (OUTPATIENT)
Age: 75
End: 2025-07-28

## 2025-07-28 ENCOUNTER — OUTPATIENT (OUTPATIENT)
Dept: OUTPATIENT SERVICES | Facility: HOSPITAL | Age: 75
LOS: 1 days | End: 2025-07-28
Payer: COMMERCIAL

## 2025-07-28 DIAGNOSIS — Z98.891 HISTORY OF UTERINE SCAR FROM PREVIOUS SURGERY: Chronic | ICD-10-CM

## 2025-07-28 DIAGNOSIS — Z98.890 OTHER SPECIFIED POSTPROCEDURAL STATES: Chronic | ICD-10-CM

## 2025-07-30 ENCOUNTER — INPATIENT (INPATIENT)
Facility: HOSPITAL | Age: 75
LOS: 0 days | Discharge: HOME | DRG: 310 | End: 2025-07-31
Attending: INTERNAL MEDICINE | Admitting: INTERNAL MEDICINE
Payer: COMMERCIAL

## 2025-07-30 ENCOUNTER — TRANSCRIPTION ENCOUNTER (OUTPATIENT)
Age: 75
End: 2025-07-30

## 2025-07-30 VITALS
HEIGHT: 64 IN | OXYGEN SATURATION: 98 % | RESPIRATION RATE: 18 BRPM | WEIGHT: 121.92 LBS | SYSTOLIC BLOOD PRESSURE: 171 MMHG | DIASTOLIC BLOOD PRESSURE: 78 MMHG | TEMPERATURE: 99 F | HEART RATE: 56 BPM

## 2025-07-30 DIAGNOSIS — Z98.890 OTHER SPECIFIED POSTPROCEDURAL STATES: Chronic | ICD-10-CM

## 2025-07-30 DIAGNOSIS — E78.5 HYPERLIPIDEMIA, UNSPECIFIED: ICD-10-CM

## 2025-07-30 DIAGNOSIS — I48.0 PAROXYSMAL ATRIAL FIBRILLATION: ICD-10-CM

## 2025-07-30 DIAGNOSIS — Z98.891 HISTORY OF UTERINE SCAR FROM PREVIOUS SURGERY: Chronic | ICD-10-CM

## 2025-07-30 LAB
BLD GP AB SCN SERPL QL: NEGATIVE — SIGNIFICANT CHANGE UP
RH IG SCN BLD-IMP: POSITIVE — SIGNIFICANT CHANGE UP
RH IG SCN BLD-IMP: POSITIVE — SIGNIFICANT CHANGE UP

## 2025-07-30 PROCEDURE — 93320 DOPPLER ECHO COMPLETE: CPT

## 2025-07-30 PROCEDURE — 86850 RBC ANTIBODY SCREEN: CPT

## 2025-07-30 PROCEDURE — 93325 DOPPLER ECHO COLOR FLOW MAPG: CPT

## 2025-07-30 PROCEDURE — 93312 ECHO TRANSESOPHAGEAL: CPT

## 2025-07-30 PROCEDURE — 86900 BLOOD TYPING SEROLOGIC ABO: CPT

## 2025-07-30 PROCEDURE — 76377 3D RENDER W/INTRP POSTPROCES: CPT

## 2025-07-30 PROCEDURE — C9399: CPT

## 2025-07-30 PROCEDURE — 93010 ELECTROCARDIOGRAM REPORT: CPT

## 2025-07-30 PROCEDURE — 76376 3D RENDER W/INTRP POSTPROCES: CPT

## 2025-07-30 PROCEDURE — 86901 BLOOD TYPING SEROLOGIC RH(D): CPT

## 2025-07-30 RX ORDER — DEXTROMETHORPHAN HBR, GUAIFENESIN 200 MG/10ML
200 LIQUID ORAL EVERY 6 HOURS
Refills: 0 | Status: DISCONTINUED | OUTPATIENT
Start: 2025-07-30 | End: 2025-07-31

## 2025-07-30 RX ORDER — ACETAMINOPHEN 500 MG/5ML
1000 LIQUID (ML) ORAL ONCE
Refills: 0 | Status: DISCONTINUED | OUTPATIENT
Start: 2025-07-30 | End: 2025-07-31

## 2025-07-30 RX ORDER — RIVAROXABAN 10 MG/1
20 TABLET, FILM COATED ORAL DAILY
Refills: 0 | Status: DISCONTINUED | OUTPATIENT
Start: 2025-07-31 | End: 2025-07-31

## 2025-07-30 RX ORDER — ACETAMINOPHEN 500 MG/5ML
1000 LIQUID (ML) ORAL ONCE
Refills: 0 | Status: COMPLETED | OUTPATIENT
Start: 2025-07-30 | End: 2025-07-30

## 2025-07-30 RX ORDER — B1/B2/B3/B5/B6/B12/VIT C/FOLIC 500-0.5 MG
1 TABLET ORAL DAILY
Refills: 0 | Status: DISCONTINUED | OUTPATIENT
Start: 2025-07-30 | End: 2025-07-31

## 2025-07-30 RX ORDER — LISINOPRIL 30 MG/1
50 TABLET ORAL DAILY
Refills: 0 | Status: DISCONTINUED | OUTPATIENT
Start: 2025-07-30 | End: 2025-07-31

## 2025-07-30 RX ORDER — SIMETHICONE 80 MG
80 TABLET,CHEWABLE ORAL EVERY 4 HOURS
Refills: 0 | Status: DISCONTINUED | OUTPATIENT
Start: 2025-07-30 | End: 2025-07-31

## 2025-07-30 RX ORDER — ATORVASTATIN CALCIUM 80 MG/1
20 TABLET, FILM COATED ORAL AT BEDTIME
Refills: 0 | Status: DISCONTINUED | OUTPATIENT
Start: 2025-07-30 | End: 2025-07-31

## 2025-07-30 RX ADMIN — Medication 400 MILLIGRAM(S): at 19:22

## 2025-07-30 RX ADMIN — LISINOPRIL 50 MILLIGRAM(S): 30 TABLET ORAL at 20:57

## 2025-07-30 RX ADMIN — ATORVASTATIN CALCIUM 20 MILLIGRAM(S): 80 TABLET, FILM COATED ORAL at 20:58

## 2025-07-30 RX ADMIN — Medication 1000 MILLIGRAM(S): at 19:54

## 2025-07-31 ENCOUNTER — TRANSCRIPTION ENCOUNTER (OUTPATIENT)
Age: 75
End: 2025-07-31

## 2025-07-31 VITALS
DIASTOLIC BLOOD PRESSURE: 80 MMHG | OXYGEN SATURATION: 97 % | SYSTOLIC BLOOD PRESSURE: 139 MMHG | TEMPERATURE: 98 F | RESPIRATION RATE: 16 BRPM | HEART RATE: 56 BPM

## 2025-07-31 LAB
ANION GAP SERPL CALC-SCNC: 14 MMOL/L — SIGNIFICANT CHANGE UP (ref 5–17)
BUN SERPL-MCNC: 25 MG/DL — HIGH (ref 7–23)
CALCIUM SERPL-MCNC: 9 MG/DL — SIGNIFICANT CHANGE UP (ref 8.4–10.5)
CHLORIDE SERPL-SCNC: 104 MMOL/L — SIGNIFICANT CHANGE UP (ref 96–108)
CO2 SERPL-SCNC: 21 MMOL/L — LOW (ref 22–31)
CREAT SERPL-MCNC: 1 MG/DL — SIGNIFICANT CHANGE UP (ref 0.5–1.3)
EGFR: 59 ML/MIN/1.73M2 — LOW
EGFR: 59 ML/MIN/1.73M2 — LOW
GLUCOSE SERPL-MCNC: 178 MG/DL — HIGH (ref 70–99)
HCT VFR BLD CALC: 35.8 % — SIGNIFICANT CHANGE UP (ref 34.5–45)
HGB BLD-MCNC: 12.9 G/DL — SIGNIFICANT CHANGE UP (ref 11.5–15.5)
MCHC RBC-ENTMCNC: 33.3 PG — SIGNIFICANT CHANGE UP (ref 27–34)
MCHC RBC-ENTMCNC: 36 G/DL — SIGNIFICANT CHANGE UP (ref 32–36)
MCV RBC AUTO: 92.5 FL — SIGNIFICANT CHANGE UP (ref 80–100)
NRBC # BLD AUTO: 0 K/UL — SIGNIFICANT CHANGE UP (ref 0–0)
NRBC # FLD: 0 K/UL — SIGNIFICANT CHANGE UP (ref 0–0)
NRBC BLD AUTO-RTO: 0 /100 WBCS — SIGNIFICANT CHANGE UP (ref 0–0)
PLATELET # BLD AUTO: 216 K/UL — SIGNIFICANT CHANGE UP (ref 150–400)
PMV BLD: 13.2 FL — HIGH (ref 7–13)
POTASSIUM SERPL-MCNC: 3.9 MMOL/L — SIGNIFICANT CHANGE UP (ref 3.5–5.3)
POTASSIUM SERPL-SCNC: 3.9 MMOL/L — SIGNIFICANT CHANGE UP (ref 3.5–5.3)
RBC # BLD: 3.87 M/UL — SIGNIFICANT CHANGE UP (ref 3.8–5.2)
RBC # FLD: 15.1 % — HIGH (ref 10.3–14.5)
SODIUM SERPL-SCNC: 139 MMOL/L — SIGNIFICANT CHANGE UP (ref 135–145)
WBC # BLD: 10.59 K/UL — HIGH (ref 3.8–10.5)
WBC # FLD AUTO: 10.59 K/UL — HIGH (ref 3.8–10.5)

## 2025-07-31 PROCEDURE — 86850 RBC ANTIBODY SCREEN: CPT

## 2025-07-31 PROCEDURE — 86901 BLOOD TYPING SEROLOGIC RH(D): CPT

## 2025-07-31 PROCEDURE — C1730: CPT

## 2025-07-31 PROCEDURE — 93655 ICAR CATH ABLTJ DSCRT ARRHYT: CPT

## 2025-07-31 PROCEDURE — C1732: CPT

## 2025-07-31 PROCEDURE — 93656 COMPRE EP EVAL ABLTJ ATR FIB: CPT

## 2025-07-31 PROCEDURE — 86900 BLOOD TYPING SEROLOGIC ABO: CPT

## 2025-07-31 PROCEDURE — C1731: CPT

## 2025-07-31 PROCEDURE — 85027 COMPLETE CBC AUTOMATED: CPT

## 2025-07-31 PROCEDURE — C1894: CPT

## 2025-07-31 PROCEDURE — C1760: CPT

## 2025-07-31 PROCEDURE — C1769: CPT

## 2025-07-31 PROCEDURE — 80048 BASIC METABOLIC PNL TOTAL CA: CPT

## 2025-07-31 PROCEDURE — 93622 COMP EP EVAL L VENTR PAC&REC: CPT

## 2025-07-31 PROCEDURE — C1893: CPT

## 2025-07-31 PROCEDURE — C1759: CPT

## 2025-07-31 PROCEDURE — C1766: CPT

## 2025-07-31 PROCEDURE — 93657 TX L/R ATRIAL FIB ADDL: CPT

## 2025-07-31 PROCEDURE — 93623 PRGRMD STIMJ&PACG IV RX NFS: CPT

## 2025-07-31 RX ORDER — DEXTROMETHORPHAN HBR, GUAIFENESIN 200 MG/10ML
10 LIQUID ORAL
Qty: 0 | Refills: 0 | DISCHARGE
Start: 2025-07-31

## 2025-07-31 RX ORDER — SIMETHICONE 80 MG
1 TABLET,CHEWABLE ORAL
Qty: 0 | Refills: 0 | DISCHARGE
Start: 2025-07-31

## 2025-07-31 RX ADMIN — Medication 1 LOZENGE: at 05:36

## 2025-08-07 DIAGNOSIS — I48.0 PAROXYSMAL ATRIAL FIBRILLATION: ICD-10-CM

## 2025-09-19 ENCOUNTER — NON-APPOINTMENT (OUTPATIENT)
Age: 75
End: 2025-09-19

## 2025-09-21 ENCOUNTER — NON-APPOINTMENT (OUTPATIENT)
Age: 75
End: 2025-09-21